# Patient Record
Sex: FEMALE | Race: WHITE | NOT HISPANIC OR LATINO | Employment: UNEMPLOYED | URBAN - METROPOLITAN AREA
[De-identification: names, ages, dates, MRNs, and addresses within clinical notes are randomized per-mention and may not be internally consistent; named-entity substitution may affect disease eponyms.]

---

## 2020-09-24 ENCOUNTER — OFFICE VISIT - RIVER FALLS (OUTPATIENT)
Dept: FAMILY MEDICINE | Facility: CLINIC | Age: 45
End: 2020-09-24
Payer: COMMERCIAL

## 2020-09-24 ASSESSMENT — MIFFLIN-ST. JEOR: SCORE: 1878.73

## 2021-04-04 ENCOUNTER — HEALTH MAINTENANCE LETTER (OUTPATIENT)
Age: 46
End: 2021-04-04

## 2021-04-12 ENCOUNTER — VIRTUAL VISIT (OUTPATIENT)
Dept: SURGERY | Facility: CLINIC | Age: 46
End: 2021-04-12
Payer: COMMERCIAL

## 2021-04-12 VITALS — BODY MASS INDEX: 44.42 KG/M2 | WEIGHT: 283 LBS | HEIGHT: 67 IN

## 2021-04-12 VITALS — HEIGHT: 67 IN | BODY MASS INDEX: 44.42 KG/M2 | WEIGHT: 283 LBS

## 2021-04-12 DIAGNOSIS — Z13.21 SCREENING FOR ENDOCRINE, NUTRITIONAL, METABOLIC AND IMMUNITY DISORDER: ICD-10-CM

## 2021-04-12 DIAGNOSIS — I10 ESSENTIAL HYPERTENSION: ICD-10-CM

## 2021-04-12 DIAGNOSIS — Z13.0 SCREENING FOR IRON DEFICIENCY ANEMIA: ICD-10-CM

## 2021-04-12 DIAGNOSIS — G47.33 OSA (OBSTRUCTIVE SLEEP APNEA): ICD-10-CM

## 2021-04-12 DIAGNOSIS — N39.3 STRESS INCONTINENCE: ICD-10-CM

## 2021-04-12 DIAGNOSIS — E66.01 MORBID OBESITY (H): Primary | ICD-10-CM

## 2021-04-12 DIAGNOSIS — R73.03 PREDIABETES: ICD-10-CM

## 2021-04-12 DIAGNOSIS — Z13.29 SCREENING FOR ENDOCRINE, NUTRITIONAL, METABOLIC AND IMMUNITY DISORDER: ICD-10-CM

## 2021-04-12 DIAGNOSIS — Z13.0 SCREENING FOR ENDOCRINE, NUTRITIONAL, METABOLIC AND IMMUNITY DISORDER: ICD-10-CM

## 2021-04-12 DIAGNOSIS — E66.01 MORBID OBESITY (H): ICD-10-CM

## 2021-04-12 DIAGNOSIS — G90.511 COMPLEX REGIONAL PAIN SYNDROME TYPE 1 OF RIGHT UPPER EXTREMITY: ICD-10-CM

## 2021-04-12 DIAGNOSIS — Z13.228 SCREENING FOR ENDOCRINE, NUTRITIONAL, METABOLIC AND IMMUNITY DISORDER: ICD-10-CM

## 2021-04-12 PROBLEM — F31.32 BIPOLAR AFFECTIVE DISORDER, CURRENTLY DEPRESSED, MODERATE (H): Status: ACTIVE | Noted: 2018-07-20

## 2021-04-12 PROCEDURE — 99205 OFFICE O/P NEW HI 60 MIN: CPT | Mod: 95 | Performed by: PHYSICIAN ASSISTANT

## 2021-04-12 PROCEDURE — 97802 MEDICAL NUTRITION INDIV IN: CPT | Mod: GT | Performed by: DIETITIAN, REGISTERED

## 2021-04-12 RX ORDER — GABAPENTIN 300 MG/1
900 CAPSULE ORAL 2 TIMES DAILY
COMMUNITY
Start: 2021-02-16 | End: 2022-09-08

## 2021-04-12 RX ORDER — DULOXETIN HYDROCHLORIDE 60 MG/1
CAPSULE, DELAYED RELEASE ORAL
COMMUNITY
Start: 2021-03-20 | End: 2021-12-07

## 2021-04-12 RX ORDER — RISPERIDONE 3 MG/1
3 TABLET ORAL 2 TIMES DAILY
COMMUNITY
Start: 2021-02-16 | End: 2022-09-08

## 2021-04-12 RX ORDER — DULOXETIN HYDROCHLORIDE 60 MG/1
20 CAPSULE, DELAYED RELEASE ORAL 2 TIMES DAILY
COMMUNITY
Start: 2021-02-16 | End: 2022-02-16

## 2021-04-12 RX ORDER — GABAPENTIN 300 MG/1
1200 CAPSULE ORAL EVERY EVENING
COMMUNITY
Start: 2021-02-16

## 2021-04-12 RX ORDER — ALBUTEROL SULFATE 90 UG/1
1 AEROSOL, METERED RESPIRATORY (INHALATION) PRN
COMMUNITY
Start: 2020-06-26 | End: 2022-09-08

## 2021-04-12 ASSESSMENT — MIFFLIN-ST. JEOR
SCORE: 1948.37
SCORE: 1948.37

## 2021-04-12 NOTE — PROGRESS NOTES
"Leanne is a 45 year old who is being evaluated via a billable video visit.      If the video visit is dropped, the invitation should be resent by: Text to cell phone: [phone]  Will anyone else be joining your video visit? No      Video-Visit Details    Type of service:  Video Visit    Video Start Time: 9:35 AM    Video End Time:10:19 AM        Originating Location (pt. Location): Home    Distant Location (provider location):  [facility] [place]     Platform used for Video Visit: [facility] Bariatric Surgery Consultation Note    2021    RE: Leanne Funes  MR#: [id number]  : 1975      Referring provider:       2021   Who referred you? My [place] provider       Chief Complaint/Reason for visit: evaluation for possible weight loss surgery    Dear No primary care provider on file.,    I had the pleasure of seeing your patient, Leanne Funes, to evaluate her obesity and consider her for possible weight loss surgery. As you know, Leanne Funes is 45 year old.  She has a height of 5' 6.5\"[pt reported[, a weight of 283 lbs 0 oz, and calculated Body mass index is 44.99 kg/m .     Assessment & Plan   Problem List Items Addressed This Visit     Morbid obesity (H) - Primary    Relevant Orders    CBC with platelets    Comprehensive metabolic panel    Hemoglobin A1c    Vitamin D Deficiency    NUTRITION REFERRAL    MENTAL HEALTH REFERRAL  - Adult; Assessments and Testing; Bariatric Eval; [facility]: [facility] [phone]; We will contact you to schedule the appointment or please call with any questions    Essential hypertension    Relevant Medications    losartan 50 MG TABS 50 mg, hydrochlorothiazide 12.5 MG CAPS 12.5 mg    Prediabetes    Stress incontinence      Other Visit Diagnoses     Screening for iron deficiency anemia        Relevant Orders    CBC with platelets    Screening for endocrine, nutritional, metabolic and immunity disorder        Relevant " Orders    Comprehensive metabolic panel    Hemoglobin A1c    Vitamin D Deficiency    RYAN (obstructive sleep apnea)               60 minutes spent on the date of the encounter doing chart review, history and exam, review test results, counseling, developing plan of care, documentation, and further activities as noted above.       HISTORY OF PRESENT ILLNESS:  Weight Loss History Reviewed with Patient 4/9/2021   How long have you been overweight? Since puberty   What is the most that you have ever weighed? 285   What is the most weight you have lost? 40   I have tried the following methods to lose weight Watching portions or calories, Exercise, Atkins type diet (low carb/high protein)   I have tried the following weight loss medications? (Check all that apply) None   Have you ever had weight loss surgery? No   Pt with 2 year old grandson who wants to improve health to be active with him.  Parents both has lapband surgery and have been successful with it.    CO-MORBIDITIES OF OBESITY INCLUDE:     4/9/2021   I have the following health issues associated with obesity: Pre-Diabetes, High Blood Pressure, Sleep Apnea, Stress Incontinence       PAST MEDICAL HISTORY:  Past Medical History:   Diagnosis Date     Bipolar affective disorder, currently depressed, moderate (H) 7/20/2018     Complex regional pain syndrome type 1 of right upper extremity      Essential hypertension 5/11/2017    Formatting of this note might be different from the original. Hypertension (HTN) Chronic     RYAN (obstructive sleep apnea)      Prediabetes 4/12/2021       PAST SURGICAL HISTORY: No history of bleeding, clotting, malignant hyperthermia, or other anesthesia problems with surgery. Denies PONV.  Past Surgical History:   Procedure Laterality Date     C VAGINAL HYSTERECTOMY  2/28/2001    Hysterectomy, Vaginal. right ovary no tremoved.     HC LAPAROSCOPY, SURGICAL; APPENDECTOMY  2/28/11      LAPAROSCOPY, SURGICAL; CHOLECYSTECTOMY  11/14/11        FAMILY HISTORY:   Family History   Problem Relation Age of Onset     Diabetes Mother      Obesity Mother S/p lapband     Diabetes Father      Obesity Father S/p lapband     Thyroid Disease Father      Anesthesia Reaction No family hx of      Blood Disease No family hx of        SOCIAL HISTORY:   Social History Questions Reviewed With Patient 4/9/2021   Which best describes your employment status (select all that apply) I am disabled [employer].  - has spinal stimulator   Which best describes your marital status:    Do you have children? Yes   Who do you have in your support network that can be available to help you for the first 2 weeks after surgery? ,daughter   Who can you count on for support throughout your weight loss surgery journey? , children, friends   Can you afford 3 meals a day?  Yes   Can you afford 50-60 dollars a month for vitamins? Yes       HABITS:     4/9/2021   How often do you drink alcohol? Monthly or less   If you do drink alcohol, how many drinks might you have in a day? (one drink = 5 oz. wine, 1 can/bottle of beer, 1 shot liquor) 1 or 2   If you previously used any of these products, what year did you quit? 2020   Have you or are you currently using street drugs or prescription strength medication for which you do not have a prescription for? No   Do you have a history of chemical dependency (alcohol or drug abuse)? No       PSYCHOLOGICAL HISTORY:   Psychological History Reviewed With Patient 4/9/2021   Have you ever attempted suicide? Never.   Have you had thoughts of suicide in the past year? No   Have you ever been hospitalized for mental illness or a suicide attempt? Never.   Do you have a history of chronic pain? Yes   Have you ever been diagnosed with fibromyalgia? No   Are you currently being treated for any of the following? (select all that apply) Depression, Bipolar- under good control.  Med management through PCP.    Are you currently seeing a therapist or  counselor?  No   Are you currently seeing a psychiatrist? No       ROS:     4/9/2021   Skin:  Skin fold rashes   HEENT: None of these   Musculoskeletal: Joint Pain- Shoulder- Aleve, Ibuprofen, Tylenol   Cardiovascular: Shortness of breath with activity   Pulmonary: Shortness of breath with activity, Snoring   Gastrointestinal: None of the above   Genitourinary: Stress incontinence    Hematological: None of the above   Neurological: None of the above   Female only: None of the above       EATING BEHAVIORS:     4/9/2021   Have you or anyone else thought that you had an eating disorder? No   Do you currently binge eat (eat a large amount of food in a short time)? No   Are you an emotional eater? Yes   Do you get up to eat after falling asleep? Yes       EXERCISE:     4/9/2021   How often do you exercise? 1 to 2 times per week   What is the duration of your exercise (in minutes)? 15 Minutes   What types of exercise do you do? walking, home gym   What keeps you from being more active?  Pain, I should be more active but I just have not gotten around to it       MEDICATIONS:  Current Outpatient Medications   Medication     albuterol (PROAIR HFA/PROVENTIL HFA/VENTOLIN HFA) 108 (90 Base) MCG/ACT inhaler     DULoxetine (CYMBALTA) 60 MG capsule     gabapentin (NEURONTIN) 300 MG capsule     Naproxen Sodium (ALEVE PO)     ondansetron (ZOFRAN ODT) 4 MG disintegrating tablet     risperiDONE (RISPERDAL) 4 MG tablet     silver sulfADIAZINE (SILVADENE) 1 % cream     sulfamethoxazole-trimethoprim (BACTRIM DS,SEPTRA DS) 800-160 MG per tablet     DULoxetine (CYMBALTA) 60 MG capsule     gabapentin (NEURONTIN) 300 MG capsule     losartan 50 MG TABS 50 mg, hydrochlorothiazide 12.5 MG CAPS 12.5 mg     No current facility-administered medications for this visit.         ALLERGIES:  Allergies   Allergen Reactions     Codeine Sulfate Nausea and Vomiting         LABS AND RECORDS REVIEWED:  Sodium   Date Value Ref Range Status   07/07/2012 141  "133 - 144 mmol/L Final     Potassium   Date Value Ref Range Status   07/07/2012 3.9 3.4 - 5.3 mmol/L Final     Chloride   Date Value Ref Range Status   07/07/2012 108 94 - 109 mmol/L Final     Carbon Dioxide   Date Value Ref Range Status   07/07/2012 24 20 - 32 mmol/L Final     Anion Gap   Date Value Ref Range Status   07/07/2012 9 6 - 17 mmol/L Final     Glucose   Date Value Ref Range Status   07/07/2012 109 (H) 60 - 99 mg/dL Final     Urea Nitrogen   Date Value Ref Range Status   07/07/2012 14 5 - 24 mg/dL Final     Creatinine   Date Value Ref Range Status   07/07/2012 0.81 0.52 - 1.04 mg/dL Final     GFR Estimate   Date Value Ref Range Status   07/07/2012 80 >60 mL/min/1.7m2 Final     Calcium   Date Value Ref Range Status   07/07/2012 8.8 8.5 - 10.4 mg/dL Final     Bilirubin Total   Date Value Ref Range Status   07/07/2012 0.3 0.2 - 1.3 mg/dL Final     Alkaline Phosphatase   Date Value Ref Range Status   07/07/2012 84 40 - 150 U/L Final     ALT   Date Value Ref Range Status   07/07/2012 27 0 - 50 U/L Final     AST   Date Value Ref Range Status   07/07/2012 26 0 - 45 U/L Final     WBC   Date Value Ref Range Status   07/07/2012 6.8 4.0 - 11.0 10e9/L Final     Hemoglobin   Date Value Ref Range Status   07/07/2012 12.4 11.7 - 15.7 g/dL Final     Hematocrit   Date Value Ref Range Status   07/07/2012 37.5 35.0 - 47.0 % Final     MCV   Date Value Ref Range Status   07/07/2012 90 78 - 100 fl Final     Platelet Count   Date Value Ref Range Status   07/07/2012 374 150 - 450 10e9/L Final         PHYSICAL EXAM:  Ht 5' 6.5\" (1.689 m)   Wt 283 lb (128.4 kg)   BMI 44.99 kg/m    GENERAL: Healthy, alert and no distress  EYES: Eyes grossly normal to inspection.  No discharge or erythema, or obvious scleral/conjunctival abnormalities.  RESP: No audible wheeze, cough, or visible cyanosis.  No visible retractions or increased work of breathing.    SKIN: Visible skin clear. No significant rash, abnormal pigmentation or " lesions.  NEURO: Cranial nerves grossly intact.  Mentation and speech appropriate for age.  PSYCH: Mentation appears normal, affect normal/bright, judgement and insight intact, normal speech and appearance well-groomed.    In summary, Leanne Funes has Class III obesity with a body mass index of Body mass index is 44.99 kg/m . kg/m2 and the comorbidities stated above. She completed an informational seminar and is a possible candidate for the laparoscopic gastric bypass.  She will have to complete the following pre-requisites:    Lose 5 lbs prior to surgery.  Goal Weight: 278 lbs    Have preoperative laboratory tests drawn.     Psychological Evaluation with MMPI and clearance for weight loss surgery.    Achieve clearance from dietitian to see surgeon.    Once the patient has completed the requirements in the above tasklist and there are no further recommendations, pt will see the surgeon for consultation for bariatric surgery. Pt to follow up in 1-2 months for a face to face visit with me. We will discuss the available weight loss surgeries including risks and benefits,  get an official weight, and do a physical exam. Patient verbalizes understanding of the process to surgery and the post operative schedule.  All questions were answered.          Sincerely,     Carly Nair PA-C

## 2021-04-12 NOTE — LETTER
Leanne Funes  April 12, 2021        Bariatric Task List      Required Weight loss:    Lose 5 lbs prior to surgery.  Goal Weight: 278 lbs  Tasks:  Have preoperative laboratory tests drawn.     Psychological Evaluation with MMPI and clearance for weight loss surgery.    Achieve clearance from dietitian to see surgeon.

## 2021-04-12 NOTE — PROGRESS NOTES
"Leanne is a 45 year old who is being evaluated via a billable video visit.      How would you like to obtain your AVS? MyChart  If the video visit is dropped, the invitation should be resent by: Text to cell phone: [phone]  Will anyone else be joining your video visit? No      Video Start Time: 10:29am    Video-Visit Details    Type of service:  Video Visit    Video End Time: 11:16am    Originating Location (pt. Location): Home    Distant Location (provider location):  [facility] [place]     Platform used for Video Visit: [employer]     New Bariatric Nutrition Consultation Note    Reason For Visit: Nutrition Assessment    Leanne Funes is a 45 year old presenting today for new bariatric nutrition consult.  Pt is interested in laparoscopic gastric bypass.  Patient is accompanied by self.    Support System Reviewed With Patient 4/9/2021   Who do you have in your support network that can be available to help you for the first 2 weeks after surgery? ,daughter   Who can you count on for support throughout your weight loss surgery journey? , children, friends       ANTHROPOMETRICS:   Estimated body mass index is 44.99 kg/m  as calculated from the following:    Height as of this encounter: 1.689 m (5' 6.5\").    Weight as of this encounter: 128.4 kg (283 lb).    Required weight loss goal pre-op: 5 lbs from initial consult weight (goal weight 278 lbs or less before surgery)       4/9/2021   I have tried the following methods to lose weight Watching portions or calories, Exercise, Atkins type diet (low carb/high protein)       Weight Loss Questions Reviewed With Patient 4/9/2021   How long have you been overweight? Since puberty       NUTRITION HISTORY:  Recall Diet Questions Reviewed With Patient 4/9/2021   Describe what you typically consume for breakfast (typical or most recent): Breakfast sandwich with egg,cheese,and meat   Describe what you typically consume for supper " (typical or most recent): Meat and cooked vegetables   Describe what you typically consume as snacks (typical or most recent): Raisens, cheese   How many ounces of water, or other low calorie drinks, do you drink daily (8 oz=1 glass)? 64 oz or more   How many ounces of caffeine (coffee, tea, pop) do you drink daily (8 oz=1 glass)? 48 oz   How many ounces of carbonated (pop, beer, sparkling water) drinks do you drinky daily (8 oz=1 glass)? 48 oz   How many ounces of juice, pop, sweet tea, sports drinks, protein drinks, other sweetened drinks, do you drink daily (8 oz=1 glass)? 48 oz   How many ounces of milk do you drink daily (8 oz=1 glass) 8 oz   Please indicate the type of milk: 2%   How often do you drink alcohol? Monthly or less   If you do drink alcohol, how many drinks might you have in a day? (one drink = 5 oz. wine, 1 can/bottle of beer, 1 shot liquor) 1 or 2       Eating Habits 4/9/2021   Do you have any dietary restrictions? No   Do you currently binge eat (eat a large amount of food in a short time)? No   Are you an emotional eater? Yes   Do you get up to eat after falling asleep? Yes   What foods do you crave? Soda and chocolate       ADDITIONAL INFORMATION:  Pt has been considering surgery for about 5 years, since her parents had lap band surgery. Pt believes eliminating caffeine/carbonation will be biggest challenge.     Dining Out History Reviewed With Patient 4/9/2021   How often do you dine out? Rarely.   Where do you dine out? (select all that apply) take out   What types of food do you order when you dine out? Fish, fries       Physical Activity Reviewed With Patient 4/9/2021   How often do you exercise? 1 to 2 times per week   What is the duration of your exercise (in minutes)? 15 Minutes   What types of exercise do you do? walking, home gym   What keeps you from being more active?  Pain, I should be more active but I just have not gotten around to it       NUTRITION DIAGNOSIS:  Obesity r/t long  history of self-monitoring deficit and excessive energy intake aeb BMI >30 kg/m2.    INTERVENTION:  Intervention Provided/Education Provided on post-op diet guidelines, vitamins/minerals essential post-operatively, GI anatomy of bariatric surgeries, ways to help prepare for post-op diet guidelines pre-operatively, portion/calorie-control, mindful eating.  Provided pt with list of goals RD contact information.      Questions Reviewed With Patient 4/9/2021   How ready are you to make changes regarding your weight? Number 1 = Not ready at all to make changes up to 10 = very ready. 10   How confident are you that you can change? 1 = Not confident that you will be successful making changes up to 10 = very confident. 10       Patient Understanding: good  Expected Compliance: good    GOALS:  Begin taking multivitamin, calcium and vitamin D per guidelines  Reduce caffeine by 50%  Reduce carbonated beverages by 50%    Follow-Up:   Recommend 2-3 follow up visits to assist with lifestyle changes or per insurance.      Time spent with patient: 47 minutes.      Shelley Romero RD, LD  Clinical Dietitian

## 2021-04-12 NOTE — PATIENT INSTRUCTIONS
Rosales Perdomo!    It was great chatting with you today! Here are some links to the information we discussed:      Vitamins/Minerals:   [email or web address]     Diet Guidelines:  [email or web address]         Here are the goals we discussed for this first month:  1. Begin taking a daily multivitamin, a calcium supplement and a vitamin D supplement - the doses/requirements for these are in the vitamin/mineral handout link above. The easiest schedule for you will be to take calcium 2-3x/ per day, and take everything else at bedtime.   2. Reduce caffeine by 50%  3. Reduce carbonated beverages by 50%     Your next visit can be scheduled via the call center at  and should be in one month. You will also need to see Carly within the next 1-2 months for an in-clinic appointment. Welcome to the program and let us know if any questions/concerns pop up!     Shelley Romero RD, LD  Clinical Dietitian

## 2021-05-16 NOTE — PROGRESS NOTES
Bariatric Pre-Surgery Visit    CC: Obesity    HPI: I had the pleasure of seeing Leanne in the office today to go over bariatric education.  I saw the patient last month to evaluate obesity and consider her for possible weight loss surgery.    Wt Readings from Last 4 Encounters:   05/17/21 282 lb 6.4 oz (128.1 kg)   04/12/21 283 lb (128.4 kg)   04/12/21 283 lb (128.4 kg)   06/25/12 231 lb (104.8 kg)      BARIATRIC METRICS:  Current Weight: 282 lb 6.4 oz (128.1 kg)  Body mass index is 44.9 kg/m .   Wt change since last visit (lbs): -0.6  Cumulative weight loss (lbs): 0.6    Labs Reviewed:  Sodium   Date Value Ref Range Status   07/07/2012 141 133 - 144 mmol/L Final     Potassium   Date Value Ref Range Status   07/07/2012 3.9 3.4 - 5.3 mmol/L Final     Chloride   Date Value Ref Range Status   07/07/2012 108 94 - 109 mmol/L Final     Carbon Dioxide   Date Value Ref Range Status   07/07/2012 24 20 - 32 mmol/L Final     Anion Gap   Date Value Ref Range Status   07/07/2012 9 6 - 17 mmol/L Final     Glucose   Date Value Ref Range Status   07/07/2012 109 (H) 60 - 99 mg/dL Final     Urea Nitrogen   Date Value Ref Range Status   07/07/2012 14 5 - 24 mg/dL Final     Creatinine   Date Value Ref Range Status   07/07/2012 0.81 0.52 - 1.04 mg/dL Final     GFR Estimate   Date Value Ref Range Status   07/07/2012 80 >60 mL/min/1.7m2 Final     Calcium   Date Value Ref Range Status   07/07/2012 8.8 8.5 - 10.4 mg/dL Final     Bilirubin Total   Date Value Ref Range Status   07/07/2012 0.3 0.2 - 1.3 mg/dL Final     Alkaline Phosphatase   Date Value Ref Range Status   07/07/2012 84 40 - 150 U/L Final     ALT   Date Value Ref Range Status   07/07/2012 27 0 - 50 U/L Final     AST   Date Value Ref Range Status   07/07/2012 26 0 - 45 U/L Final     WBC   Date Value Ref Range Status   07/07/2012 6.8 4.0 - 11.0 10e9/L Final     Hemoglobin   Date Value Ref Range Status   07/07/2012 12.4 11.7 - 15.7 g/dL Final     Hematocrit   Date Value Ref Range  "Status   07/07/2012 37.5 35.0 - 47.0 % Final     MCV   Date Value Ref Range Status   07/07/2012 90 78 - 100 fl Final     Platelet Count   Date Value Ref Range Status   07/07/2012 374 150 - 450 10e9/L Final       PE:  /78   Pulse 104   Ht 5' 6.5\" (1.689 m)   Wt 282 lb 6.4 oz (128.1 kg)   SpO2 94%   BMI 44.90 kg/m      GENERAL:  Good development and normal affect in no acute distress. Patient is alert and orientated x 3 and answers all questions appropriately.  HEENT: Head is normocephalic, nontraumatic. Pupils equal and round without conjunctival injection. Neck is supple without lymphadenopathy, thyroidmegaly, or mass. Trachea midline. Dentition WNL.   CARDIOVASCULAR:  Regular rate and rhythm without murmurs, rubs, or gallops.  RESPIRATORY: Lungs are clear to auscultation bilaterally with good breath sounds.  GASTROINTESTINAL: Abdomen is obese, nondistended, soft, nontender, without organomegaly or masses. No bruits.  LOWER EXTREMITIES: No LE edema bilaterally, no cyanosis, ulceration, or chronic venous stasis noted.  MUSCULOSKELETAL:  Moves all 4 extremities equal and strong. Has a normal gait.   NEUROLOGIC: Cranial nerves II-XII grossly intact.  SKIN: No intertriginous irritation or rash.     Assessment:  Morbid Obesity  Bariatric Education    Plan:  Reviewed tasklist    Lose 5 lbs prior to surgery.  Goal Weight: 278 lbs- pending    Have preoperative laboratory tests drawn. -pending    Psychological Evaluation with MMPI and clearance for weight loss surgery.- pending    Achieve clearance from dietitian to see surgeon.-pending    [facility] Call to Change Program- sent message to MA to confirm enrollment..     Today in the office we discussed gastric bypass surgery.  Preoperative, perioperative, and postoperative processes, management, and follow up were addressed.  Risks and benefits were outlined including the risk of death, PE, DVT, ulcer, N/V, stricture, hernia, wound infection, weight regain, and vitamin " deficiencies. I emphasized exercise and activity along with appropriate food choice as the main foundation for weight loss with surgery providing surgical reinforcement of this.  A goal sheet and support group handout were given to the patient. Patient contract was signed.     Once the patient has completed their task list and there are no further recommendations, they will see the surgeon for consultation for bariatric surgery.  All questions were answered. Patient verbalizes understanding of the process to surgery and expectations for the postoperative period including the need for lifelong lifestyle changes, vitamin supplementation, and laboratory monitoring.      FOLLOW-UP:  Return to clinic in 1 month to see the dietician.      25 minutes spent on the date of the encounter doing chart review, patient visit and documentation

## 2021-05-17 ENCOUNTER — OFFICE VISIT (OUTPATIENT)
Dept: SURGERY | Facility: CLINIC | Age: 46
End: 2021-05-17
Payer: COMMERCIAL

## 2021-05-17 ENCOUNTER — HOSPITAL ENCOUNTER (OUTPATIENT)
Dept: LAB | Facility: CLINIC | Age: 46
Discharge: HOME | End: 2021-05-17
Admitting: PHYSICIAN ASSISTANT
Payer: COMMERCIAL

## 2021-05-17 VITALS
BODY MASS INDEX: 44.32 KG/M2 | WEIGHT: 282.4 LBS | OXYGEN SATURATION: 94 % | HEART RATE: 104 BPM | HEIGHT: 67 IN | SYSTOLIC BLOOD PRESSURE: 122 MMHG | DIASTOLIC BLOOD PRESSURE: 78 MMHG

## 2021-05-17 DIAGNOSIS — Z71.89 ENCOUNTER FOR PRE-BARIATRIC SURGERY COUNSELING AND EDUCATION: ICD-10-CM

## 2021-05-17 DIAGNOSIS — G47.33 OBSTRUCTIVE SLEEP APNEA SYNDROME: ICD-10-CM

## 2021-05-17 DIAGNOSIS — E66.01 MORBID OBESITY (H): Primary | ICD-10-CM

## 2021-05-17 DIAGNOSIS — Z13.21 SCREENING FOR ENDOCRINE, NUTRITIONAL, METABOLIC AND IMMUNITY DISORDER: ICD-10-CM

## 2021-05-17 DIAGNOSIS — Z13.0 SCREENING FOR ENDOCRINE, NUTRITIONAL, METABOLIC AND IMMUNITY DISORDER: ICD-10-CM

## 2021-05-17 DIAGNOSIS — Z13.0 SCREENING FOR IRON DEFICIENCY ANEMIA: ICD-10-CM

## 2021-05-17 DIAGNOSIS — Z13.228 SCREENING FOR ENDOCRINE, NUTRITIONAL, METABOLIC AND IMMUNITY DISORDER: ICD-10-CM

## 2021-05-17 DIAGNOSIS — Z13.29 SCREENING FOR ENDOCRINE, NUTRITIONAL, METABOLIC AND IMMUNITY DISORDER: ICD-10-CM

## 2021-05-17 DIAGNOSIS — E66.01 MORBID OBESITY (H): ICD-10-CM

## 2021-05-17 LAB
ALBUMIN SERPL-MCNC: 3.6 G/DL (ref 3.4–5)
ALP SERPL-CCNC: 101 U/L (ref 40–150)
ALT SERPL W P-5'-P-CCNC: 46 U/L (ref 0–50)
ANION GAP SERPL CALCULATED.3IONS-SCNC: 5 MMOL/L (ref 3–14)
AST SERPL W P-5'-P-CCNC: 24 U/L (ref 0–45)
BILIRUB SERPL-MCNC: 0.2 MG/DL (ref 0.2–1.3)
BUN SERPL-MCNC: 14 MG/DL (ref 7–30)
CALCIUM SERPL-MCNC: 9.1 MG/DL (ref 8.5–10.1)
CHLORIDE SERPL-SCNC: 103 MMOL/L (ref 94–109)
CO2 SERPL-SCNC: 28 MMOL/L (ref 20–32)
CREAT SERPL-MCNC: 0.86 MG/DL (ref 0.52–1.04)
ERYTHROCYTE [DISTWIDTH] IN BLOOD BY AUTOMATED COUNT: 13.2 % (ref 10–15)
GFR SERPL CREATININE-BSD FRML MDRD: 81 ML/MIN/{1.73_M2}
GLUCOSE SERPL-MCNC: 103 MG/DL (ref 70–99)
HBA1C MFR BLD: 6 % (ref 0–5.6)
HCT VFR BLD AUTO: 39.7 % (ref 35–47)
HGB BLD-MCNC: 12.8 G/DL (ref 11.7–15.7)
MCH RBC QN AUTO: 29.1 PG (ref 26.5–33)
MCHC RBC AUTO-ENTMCNC: 32.2 G/DL (ref 31.5–36.5)
MCV RBC AUTO: 90 FL (ref 78–100)
PLATELET # BLD AUTO: 411 10E9/L (ref 150–450)
POTASSIUM SERPL-SCNC: 4.1 MMOL/L (ref 3.4–5.3)
PROT SERPL-MCNC: 7.4 G/DL (ref 6.8–8.8)
RBC # BLD AUTO: 4.4 10E12/L (ref 3.8–5.2)
SODIUM SERPL-SCNC: 136 MMOL/L (ref 133–144)
WBC # BLD AUTO: 11.7 10E9/L (ref 4–11)

## 2021-05-17 PROCEDURE — 80053 COMPREHEN METABOLIC PANEL: CPT | Performed by: PHYSICIAN ASSISTANT

## 2021-05-17 PROCEDURE — 85027 COMPLETE CBC AUTOMATED: CPT | Performed by: PHYSICIAN ASSISTANT

## 2021-05-17 PROCEDURE — 82306 VITAMIN D 25 HYDROXY: CPT | Performed by: PHYSICIAN ASSISTANT

## 2021-05-17 PROCEDURE — 83036 HEMOGLOBIN GLYCOSYLATED A1C: CPT | Mod: GZ | Performed by: PHYSICIAN ASSISTANT

## 2021-05-17 PROCEDURE — 99213 OFFICE O/P EST LOW 20 MIN: CPT | Performed by: PHYSICIAN ASSISTANT

## 2021-05-17 ASSESSMENT — MIFFLIN-ST. JEOR: SCORE: 1945.65

## 2021-05-18 LAB — DEPRECATED CALCIDIOL+CALCIFEROL SERPL-MC: 25 UG/L (ref 20–75)

## 2021-05-21 ENCOUNTER — VIRTUAL VISIT (OUTPATIENT)
Dept: SURGERY | Facility: CLINIC | Age: 46
End: 2021-05-21
Payer: COMMERCIAL

## 2021-05-21 VITALS — WEIGHT: 285.6 LBS | BODY MASS INDEX: 45.41 KG/M2

## 2021-05-21 DIAGNOSIS — E66.01 MORBID OBESITY (H): ICD-10-CM

## 2021-05-21 PROCEDURE — 97803 MED NUTRITION INDIV SUBSEQ: CPT | Mod: GT | Performed by: DIETITIAN, REGISTERED

## 2021-05-21 NOTE — PROGRESS NOTES
"Video-Visit Details    Type of service:  Video Visit    Video Start Time (time video started): 9:27    Video End Time (time video stopped): 9:42    Originating Location (pt. Location): Other sitting in parked car    Distant Location (provider location):  [facility] [place]     Mode of Communication:  Video Conference via [employer]    PRE SURGICAL WEIGHT LOSS NUTRITION APPOINTMENT    Leanne Funes  1975  female  [id number]  45 year old    ASSESSMENT    Desired Surgical Procedure: gastric bypass    REASON FOR VISIT:  Leanne Funes is a 45 year old year old female presents today for a pre-surgical weight loss follow-up appointment. Patient accompanied by self.    DIAGNOSIS:  Weight Status Obesity Grade III BMI >40    ANTHROPOMETRICS:  Height: 5'6.5\"  Initial Weight: 283 lbs     Current visit: 285.6 lbs   BMI: 45.4 kg/(m^2).    VITAMINS AND MINERALS:  1 Multivitamin with Minerals  Calcium with Vitamin D 3 times per day   5000 international unit(s) Vitamin D    NUTRITION HISTORY:  Breakfast: klein, 2 eggs, 2 pc low carb bread (8:00)  Lunch: leftovers (2 chicken legs and broccoli/cauliflower)  Supper: turkey burger + broccoli/cauliflower + cabbage  Snacks: radishes (bag); 2 cookies   Fluids Consumed: Water and decaf, unsweetened tea   Eating slower: Yes  Chewing foods thoroughly: Yes  Take 20-30 minutes to consume each meal: Yes  Fluids and meals separate by at least 30 minutes: improving started to think about   Carbonation: Diet Mt Dew   Caffeine: Mt Dew  Additional Information: Patient has eliminated potatoes, regular bread and rice from diet.  Patient has not received call from [facility] for phone consults.  Patient reduced Diet Mt Dew intake.      PHYSICAL ACTIVITY:  Type: walking   Frequency: 4 (days per week)  Duration: 30-40 (minutes)     DIAGNOSIS:  Previous Nutrition Diagnosis: Obesity related to long history of self- monitoring deficit and excessive energy intake " evidenced by BMI of 44.9 kg/m2  No change, modified below    Previous goals:   Begin taking multivitamin, calcium and vitamin D per guidelines-met  Reduce caffeine by 50%-met  Reduce carbonated beverages by 50%-met    Current Nutrition Diagnosis: Obesity related to long history of self-monitoring deficit and excessive energy intake as evidenced by BMI of 45.4 kg/m2.    INTERVENTION:  Nutrition Prescription: Recommended energy/nutrient modification.    GOALS:  Check calcium dose  Reduce diet Mt Dew by 50%  Practice avoiding liquids with your meals    Intervention:  - Discussed progress towards previous goals.  - Reinforced importance of making behavior changes in preparation for bariatric surgery.   - Assessed learning needs and learning preferences       NUTRITION MONITORING AND EVALUATION:  Expected patient engagement: good  Patient demonstrated good understanding.     Follow up: Continue to monitor patient closely regarding weight loss and diet.  # of visits needed: 2-3  Cleared by RD: No     TIME SPENT WITH PATIENT: 15 minutes    Elena José, RD, LD  [facility] Dietitian/[facility]   [phone] (office phone)

## 2021-06-03 NOTE — PROGRESS NOTES
"Video-Visit Details    Type of service:  Video Visit    Video Start Time: 11:30    Video End Time: 11:48    Originating Location (pt. Location): Other patient's daughter's house in [place]     Distant Location (provider location): Provider Remote Setting    Platform used for Video Visit: [employer]     PRE SURGICAL WEIGHT LOSS NUTRITION APPOINTMENT    Leanne Funes  1975  female  [id number]  45 year old    ASSESSMENT    Desired Surgical Procedure: gastric bypass    REASON FOR VISIT:  Leanne Funes is a 45 year old year old female presents today for a pre-surgical weight loss follow-up appointment. Patient accompanied by self.    DIAGNOSIS:  Weight Status Obesity Grade III BMI >40    ANTHROPOMETRICS:  Height: 66.5\"    Initial Weight: 283 lbs     Weight last visit: 285.6 lbs    Current weight: does not know current weight (282 lbs in clinic with provider-5/17/2021)  BMI: 44.8 kg/m2    VITAMINS AND MINERALS:  1 Multivitamin with Minerals  1000 mg Calcium, mg and zinc 3 times per day   5000 international unit(s) Vitamin D    NUTRITION HISTORY:  Breakfast: fruit and cereal or fruit and 2-3 eggs depending on hunger (8:00)  Lunch: leftovers (2 chicken legs and broccoli/cauliflower) or summer sausage sandwich + cauliflower and broccoli with ranch   Supper: turkey burger + broccoli/cauliflower + cabbage; meat and vegetables (reducing spice so can drink less fluids as realized drinks 32 oz fluids with meals)  Snacks: radishes (bag); tomatoes, broccoli, cauliflower   Fluids Consumed: Water and decaf, unsweetened tea  Eating slower: Yes-describes self as slow eater   Chewing foods thoroughly: Yes  Take 20-30 minutes to consume each meal: Yes  Fluids and meals separate by at least 30 minutes: No  Carbonation: none  Caffeine: none   Additional Information: Patient received phone call from [facility] and has appointments scheduled for phone line.  Patient working on necessary behaviors for surgery.      PHYSICAL ACTIVITY:  Type: " walking   Frequency: 3-4 (days per week)  Duration: 30 (minutes)     DIAGNOSIS:  Previous Nutrition Diagnosis: Obesity related to long history of self- monitoring deficit and excessive energy intake evidenced by BMI of 45.4 kg/m2  No change, modified below    Previous goals:   Check calcium dose - met  Reduce diet Mt Dew by 50% - met  Practice avoiding liquids with your meals -improving (able to reduce to 16 oz per paolo)    Current Nutrition Diagnosis: Obesity related to long history of self-monitoring deficit and excessive energy intake as evidenced by BMI of 44.8 kg/m2 (per PA-C appointment 5/17/2021)    INTERVENTION:  Nutrition Prescription: Recommended energy/nutrient modification.    GOALS:  Avoid liquids 30 minutes before, during and after meals   Reduce calcium to 2 per day   Continue exercising 3-4 days per week   Weigh self prior to next appointment     Intervention:  - Discussed progress towards previous goals.  - Reinforced importance of making behavior changes in preparation for bariatric surgery.   - Assessed learning needs and learning preferences  - Congratulated patient for eliminating soda      NUTRITION MONITORING AND EVALUATION:  Expected patient engagement: good  Patient demonstrated good understanding.     Follow up: Continue to monitor patient closely regarding weight loss and diet.  # of visits needed: 1-2  Cleared by RD: No     TIME SPENT WITH PATIENT: 18 minutes    Elena José, RD, LD  [facility] Dietitian/[facility] Loss [facility]   [phone] (office phone)

## 2021-06-04 ENCOUNTER — VIRTUAL VISIT (OUTPATIENT)
Dept: SURGERY | Facility: CLINIC | Age: 46
End: 2021-06-04
Payer: MEDICARE

## 2021-06-04 DIAGNOSIS — E66.01 MORBID OBESITY (H): ICD-10-CM

## 2021-06-04 PROCEDURE — 97803 MED NUTRITION INDIV SUBSEQ: CPT | Mod: 95 | Performed by: DIETITIAN, REGISTERED

## 2021-07-08 ENCOUNTER — VIRTUAL VISIT (OUTPATIENT)
Dept: SURGERY | Facility: CLINIC | Age: 46
End: 2021-07-08
Payer: MEDICARE

## 2021-07-08 VITALS — BODY MASS INDEX: 45.26 KG/M2 | WEIGHT: 284.7 LBS

## 2021-07-08 DIAGNOSIS — E66.01 MORBID OBESITY (H): ICD-10-CM

## 2021-07-08 PROCEDURE — 99441 PR PHYSICIAN TELEPHONE EVALUATION 5-10 MIN: CPT | Mod: 95 | Performed by: DIETITIAN, REGISTERED

## 2021-07-08 NOTE — PROGRESS NOTES
"Originating Location (pt. Location): Home    Distant Location (provider location):  [facility] [place]     Mode of Communication:  Telephone     PRE SURGICAL WEIGHT LOSS NUTRITION APPOINTMENT    Leanne Funes  1975  female  [id number]  45 year old    ASSESSMENT    Desired Surgical Procedure: gastric bypass    REASON FOR VISIT:  Leanne Funes is a 45 year old year old female presents today for a pre-surgical weight loss follow-up appointment. Patient accompanied by self.    DIAGNOSIS:  Weight Status Obesity Grade III BMI >40    ANTHROPOMETRICS:  Height: 66.5\"    Initial Weight: 283 lbs     Weight last visit: 282.6 lbs    Current weight: 284.7 lbs   BMI: 44.8 kg/m2    VITAMINS AND MINERALS:  1 Multivitamin with Minerals  1000 mg Calcium, mg and zinc 2 times per day   5000 international unit(s) Vitamin D     NUTRITION HISTORY:  Breakfast: shake (protein powder, berries, water) within 2 hours of waking   fruit and cereal or fruit and 2-3 eggs depending on hunger (8:00)  Lunch: leftovers (2 chicken legs and broccoli/cauliflower) or summer sausage/chicken salad sandwich (low carb bread) + fruit or cauliflower and broccoli with ranch or eggs   Supper: turkey burger + broccoli/cauliflower + cabbage; meat and vegetables (reducing spice so can drink less fluids as realized drinks 32 oz fluids with meals) meat + vegetable + sweet potato with butter   Snacks: radishes (bag); tomatoes, broccoli, cauliflower; trying to limit   Fluids Consumed: 32 oz Water and decaf, unsweetened tea-32 oz   Eating slower: Yes  Chewing foods thoroughly: Yes  Take 20-30 minutes to consume each meal: Yes  Fluids and meals separate by at least 30 minutes: Yes  Carbonation: none  Caffeine: none   Additional Information: Patient continues working on behavior changes.  Patient frustrated she is not losing weight.     PHYSICAL ACTIVITY:  Type: riding bike (indoor)   Frequency: 4 (days per week)  Duration: 5-30 " (minutes)     DIAGNOSIS:  Previous Nutrition Diagnosis: Obesity related to long history of self- monitoring deficit and excessive energy intake evidenced by BMI of 44.8 kg/m2  No change, modified below    Previous goals:   Avoid liquids 30 minutes before, during and after meals -met  Reduce calcium to 2 per day -met  Continue exercising 3-4 days per week -met  Weigh self prior to next appointment -met     Current Nutrition Diagnosis: Obesity related to long history of self-monitoring deficit and excessive energy intake as evidenced by BMI of 45.1 kg/m2.    INTERVENTION:  Nutrition Prescription: Recommended energy/nutrient modification.    GOALS:  Increase water >32 oz  Exercise 4 days per week for 30 minutes  Trial of modified weight loss diet  Continue practicing pre surgery diet guidelines     Intervention:  - Discussed progress towards previous goals.  - Reinforced importance of making behavior changes in preparation for bariatric surgery.   - Assessed learning needs and learning preferences  - Provided modified weight loss diet     NUTRITION MONITORING AND EVALUATION:  Expected patient engagement: good  Patient demonstrated good understanding.    Follow up: Continue to monitor patient closely regarding weight loss and diet.  # of visits needed: 1 or until meets weight loss goal   Cleared by RD: No     TIME SPENT WITH PATIENT: 15 minutes    Elena José, RD, LD  [facility] Outpatient Dietitian/Weight Loss Clinic   [phone] (office phone)

## 2021-07-24 ENCOUNTER — HEALTH MAINTENANCE LETTER (OUTPATIENT)
Age: 46
End: 2021-07-24

## 2021-08-05 ENCOUNTER — VIRTUAL VISIT (OUTPATIENT)
Dept: BEHAVIORAL HEALTH | Facility: CLINIC | Age: 46
End: 2021-08-05
Payer: COMMERCIAL

## 2021-08-05 ENCOUNTER — DOCUMENTATION ONLY (OUTPATIENT)
Dept: BEHAVIORAL HEALTH | Facility: CLINIC | Age: 46
End: 2021-08-05

## 2021-08-05 DIAGNOSIS — F39 UNSPECIFIED MOOD (AFFECTIVE) DISORDER (H): Primary | ICD-10-CM

## 2021-08-05 PROCEDURE — 90834 PSYTX W PT 45 MINUTES: CPT | Mod: GT | Performed by: PSYCHOLOGIST

## 2021-08-05 ASSESSMENT — ANXIETY QUESTIONNAIRES
IF YOU CHECKED OFF ANY PROBLEMS ON THIS QUESTIONNAIRE, HOW DIFFICULT HAVE THESE PROBLEMS MADE IT FOR YOU TO DO YOUR WORK, TAKE CARE OF THINGS AT HOME, OR GET ALONG WITH OTHER PEOPLE: SOMEWHAT DIFFICULT
3. WORRYING TOO MUCH ABOUT DIFFERENT THINGS: NOT AT ALL
GAD7 TOTAL SCORE: 2
2. NOT BEING ABLE TO STOP OR CONTROL WORRYING: NOT AT ALL
5. BEING SO RESTLESS THAT IT IS HARD TO SIT STILL: NOT AT ALL
6. BECOMING EASILY ANNOYED OR IRRITABLE: SEVERAL DAYS
7. FEELING AFRAID AS IF SOMETHING AWFUL MIGHT HAPPEN: SEVERAL DAYS
1. FEELING NERVOUS, ANXIOUS, OR ON EDGE: NOT AT ALL

## 2021-08-05 ASSESSMENT — COLUMBIA-SUICIDE SEVERITY RATING SCALE - C-SSRS
5. HAVE YOU STARTED TO WORK OUT OR WORKED OUT THE DETAILS OF HOW TO KILL YOURSELF? DO YOU INTEND TO CARRY OUT THIS PLAN?: NO
ATTEMPT LIFETIME: NO
3. HAVE YOU BEEN THINKING ABOUT HOW YOU MIGHT KILL YOURSELF?: NO
ATTEMPT PAST THREE MONTHS: NO
TOTAL  NUMBER OF INTERRUPTED ATTEMPTS PAST 3 MONTHS: NO
5. HAVE YOU STARTED TO WORK OUT OR WORKED OUT THE DETAILS OF HOW TO KILL YOURSELF? DO YOU INTEND TO CARRY OUT THIS PLAN?: NO
2. HAVE YOU ACTUALLY HAD ANY THOUGHTS OF KILLING YOURSELF?: NO
1. IN THE PAST MONTH, HAVE YOU WISHED YOU WERE DEAD OR WISHED YOU COULD GO TO SLEEP AND NOT WAKE UP?: NO
4. HAVE YOU HAD THESE THOUGHTS AND HAD SOME INTENTION OF ACTING ON THEM?: NO
6. HAVE YOU EVER DONE ANYTHING, STARTED TO DO ANYTHING, OR PREPARED TO DO ANYTHING TO END YOUR LIFE?: NO
1. IN THE PAST MONTH, HAVE YOU WISHED YOU WERE DEAD OR WISHED YOU COULD GO TO SLEEP AND NOT WAKE UP?: NO
TOTAL  NUMBER OF ABORTED OR SELF INTERRUPTED ATTEMPTS PAST 3 MONTHS: NO
6. HAVE YOU EVER DONE ANYTHING, STARTED TO DO ANYTHING, OR PREPARED TO DO ANYTHING TO END YOUR LIFE?: NO
TOTAL  NUMBER OF ABORTED OR SELF INTERRUPTED ATTEMPTS PAST LIFETIME: NO
2. HAVE YOU ACTUALLY HAD ANY THOUGHTS OF KILLING YOURSELF LIFETIME?: NO
4. HAVE YOU HAD THESE THOUGHTS AND HAD SOME INTENTION OF ACTING ON THEM?: NO
TOTAL  NUMBER OF INTERRUPTED ATTEMPTS LIFETIME: NO

## 2021-08-05 ASSESSMENT — PATIENT HEALTH QUESTIONNAIRE - PHQ9
5. POOR APPETITE OR OVEREATING: NOT AT ALL
SUM OF ALL RESPONSES TO PHQ QUESTIONS 1-9: 3

## 2021-08-05 NOTE — PROGRESS NOTES
[facility]  [facility]  [facility]       PATIENT'S NAME: Leanne Funes  PREFERRED NAME: Leanne  PRONOUNS: she/her  MRN: [id number]  : 1975  [address]  [address] [facility] [address]  Pt was in [facility] during visit  ACCT. NUMBER:  [id number]  DATE OF SERVICE: 21,21  START TIME: 1300  END TIME: 1350  PREFERRED PHONE: [phone]  May we leave a program related message: Yes  SERVICE MODALITY:  Started in video visit, had to change to Telephone  Visit due to poor video reception      Provider verified identity through the following two step process.  Patient provided:  Patient  and Patient address    Telemedicine Visit: The patient's condition can be safely assessed and treated via synchronous audio and visual telemedicine encounter.      Reason for Telemedicine Visit: Services only offered telehealth    Originating Site (Patient Location): Patient's other alone in car    Distant Site (Provider Location): Provider Remote Setting- Home Office    Consent:  The patient/guardian has verbally consented to: the potential risks and benefits of telemedicine (video visit) versus in person care; bill my insurance or make self-payment for services provided; and responsibility for payment of non-covered services.     Patient would like the video invitation sent by:  Send to e-mail at: [email or web address]    Mode of Communication:  Video Conference via ThisClicks    As the provider I attest to compliance with applicable laws and regulations related to telemedicine.    UNIVERSAL ADULT Mental Health DIAGNOSTIC ASSESSMENT    Identifying Information:  Patient is a 46 year old,   .  The pronoun use throughout this assessment reflects the patient's chosen pronoun.  Patient was referred for an assessment by primary care providerother  .  Patient attended the session alone.     Chief Complaint:   Patient was referred for an evaluation by the [facility]  [facility]. Patient is presenting for a psychological evaluation as a routine part of the process for pursuing weight loss surgery. She said she has \"fought with weight my whole life\".  She said she has been trying to lose weight for a couple of years and it is not working.  She said she thinks if she has the tool of the surgery it will help her take the weight off.   Patient reports they have attempted to lose weight in the past through, diet, exercise,  Keto, counting carbs.  She said her first attempt at weight loss was when she was 15 years old. She said she would diet and exercise and lose weight, then it would creep back on again.  She said the most weight she has ever lost dieting was right after high school.  She said she lost 70 pounds with diet and exercise.  She said she then \"yoyo'd with my weight\".  She said this lasted a \"couple years\". She said she weighs 271 currently.  She said her lowest weight has been 170.    The Patient reports they believe the surgery will benefit them by having better mobility.  Wants to get back into outdoor activities such as hunting and being able to do with activities with her grandson without being out of breath.         Social/Family History:  Patient reported they grew up in [facility] in [facility]--father was a civilian in the .  [place].  She said she moved to [place] after meeting her  in [facility].  She said he is from [place].   They were raised by biological parents.  Parents stayed ..She reports 2 brothers.   Patient reported that their childhood was \"pretty normal\".  She said she was raised by mother and father was gone a lot.  She said it was good\" .  Patient described their current relationships with family of origin as good relationship with parents.  She said she talks to her mother everyday and considers her one of her best friends.      The patient describes their cultural background as " ; .  Cultural influences and impact on patient's life structure, values, norms, and healthcare: Racial or Ethnic Self-Identification .  Contextual influences on patient's health include: Contextual factors affecting health;   She said as a  child, she was supposed to be strong all of the time.  She said she had to be careful of relationships because they were going to be broken in 2 years when she moved.   She said she was poor growing up.  She said they did not have a lot of money so everyone ate everything they were given.  She said she ate everything on her plate.  She said [place]ost[place]  Food was a huge social connection for everyone.  Any time she went to someone's house she was offered food.  She said it was a big deal for everyone.   These factors will be addressed in the Preliminary Treatment plan.  Patient identified their preferred language to be English. Patient reported they does not need the assistance of an  or other support involved in therapy.     Patient reported she was gestated to 40 weeks. She said she met all developmental milestones in the usual manner.  She said she had a speech impediment, does not remember what the impediment was, she said she went to speech therapy as a child and it resolved. .   Patient's highest education level was college graduate.  BA in Nursing.  Patient identified the following learning problems: none reported.  Modifications will not be used to assist communication in therapy.   Patient reports they are  able to understand written materials.    Patient reported the following relationship history [id number].  Patient's current relationship status is  since 1994.   Patient identified their sexual orientation as heterosexual.  Patient reported having two child(alexis).  She said she has 1 grandchild.  Patient identified mother, adult child, friends and spouse as part of their support system.   Patient identified the quality of these relationships as stable and meaningful.      Patient's current living/housing situation involves staying in own home/apartment.  They live with , daughter, daughter's fiance, grandchild and they report that housing is stable.     Patient is currently Chronic Regional Pain Syndrome--she is on disability for the past 3 years..  Patient reports their finances are obtained through disability.  Patient does not identify finances as a current stressor.  She said they manage their house on a cash basis--if there is no cash for it she does not do it      Patient reported that they have not been involved with the legal system.   Patient denies being on probation / parole / under the jurisdiction of the court.    Patient's Strengths and Limitations:  Patient identified the following strengths or resources that will help them succeed in treatment: motivation. Things that may interfere with the patient's success in treatment include: none identified.     Personal and Family Medical History:   Patient does not report a family history of mental health concerns.  Patient reports family history includes Diabetes in her father and mother; Obesity in her father and mother; Thyroid Disease in her father..     Patient does report Mental Health Diagnosis and/or Treatment.  Patient Patient reported the following previous diagnoses which include(s): a Bipolar Disorder.  Patient reported symptoms began adolescence.  She said she was diagnosed 4-5 years ago from an MD at the [facility].   Patient has received mental health services in the past: psychiatry, medication,.  Psychiatric Hospitalizations: None.  Patient denies a history of civil commitment.  Patient is not receiving other mental health services.  These include primary care provider at [facility] in [facility]  For follow-up on TBS.         Patient has had a physical exam to rule out medical causes for current symptoms.   Date of last physical exam was within the past year. Client was encouraged to follow up with PCP if symptoms were to develop. The patient has a non-[facility] Primary Care Provider. Their PCP is Jesi Crump..  Patient reports the following current medical concerns: chronic pain, high blood pressure, sleep apnea.  Patient denies any issues with pain..   There are not significant appetite / nutritional concerns / weight changes.   Patient does not report a history of head injury / trauma / cognitive impairment.    Changed portion sizes, does not drink while eating, trying to exercise more.  She said she only notices hunger as a trigger for wanting to eat.  Discussed spending time noticing if there is any other reason she eats  She then said she eats when bored.  Needs to come up with a list of things to do--at least 10 when she is bored that do not involve eating. List; reading, spending time with grandson, gardening, walking, spending time outside.      Patient reports current meds as:       Medication Adherence:  Patient reports taking prescribed medications as prescribed.    Patient Allergies:    Allergies   Allergen Reactions     Codeine Sulfate Nausea and Vomiting       Medical History:    Past Medical History:   Diagnosis Date     Bipolar affective disorder, currently depressed, moderate (H) 7/20/2018     Complex regional pain syndrome type 1 of right upper extremity      Essential hypertension 5/11/2017    Formatting of this note might be different from the original. Hypertension (HTN) Chronic     RYAN (obstructive sleep apnea)      Prediabetes 4/12/2021         Current Mental Status Exam:   Appearance:  Appropriate    Eye Contact:  Good   Psychomotor:  Normal       Gait / station:  no problem  Attitude / Demeanor: Cooperative   Speech      Rate / Production: Normal/ Responsive      Volume:  Normal  volume      Language:  no problems  Mood:   Normal  Affect:   Appropriate    Thought Content: Clear   Thought  Process: Coherent  Goal Directed  Logical       Associations: No loosening of associations  Insight:   Fair   Judgment:  Intact   Orientation:  All  Attention/concentration: Good    Rating Scales:    PHQ9:    PHQ-9 SCORE 7/19/2021 8/5/2021   PHQ-9 Total Score MyChart 1 (Minimal depression) -   PHQ-9 Total Score 1 3   ;    GAD7:    ADOLFO-7 SCORE 7/19/2021 8/5/2021   Total Score 3 (minimal anxiety) -   Total Score 3 2     CGI:     First:No data recorded;    Most recentNo data recorded    Substance Use:  Patient did not report a family history of substance use concerns; see medical history section for details.  Patient has not received chemical dependency treatment in the past.  Patient has not ever been to detox.      Patient is not currently receiving any chemical dependency treatment. Patient reported the following problems as a result of their substance use:  none reported.    Patient reports she uses alcohol infrequently; approximately bimonthly, 2 drinks per occasion.    Patient denies using tobacco.  Patient denies using cannabis.  Patient denies using caffeine.  Patient reports using/abusing the following substance(s). Patient reported no other substance use.     CAGE- AID:    CAGE-AID Total Score 7/19/2021   Total Score 0   Total Score MyChart 0 (A total score of 2 or greater is considered clinically significant)       Substance Use: No symptoms    Based on the negative CAGE score and clinical interview there  are not indications of drug or alcohol abuse.    Significant Losses / Trauma / Abuse / Neglect Issues:   Patient did not    serve in the .  There are indications or report of significant loss, trauma, abuse or neglect issues related to: are no indications and client denies any losses, trauma, abuse, or neglect concerns.  Concerns for possible neglect are not present.     Safety Assessment:   Current Safety Concerns:  Britt Suicide Severity Rating Scale (Lifetime/Recent)  Britt Suicide  Severity Rating (Lifetime/Recent) 8/5/2021   1. Wish to be Dead (Lifetime) No   1. Wish to be Dead (Recent) No   2. Non-Specific Active Suicidal Thoughts (Lifetime) No   2. Non-Specific Active Suicidal Thoughts (Recent) No   3. Active Suicidal Ideation with any Methods (Not Plan) Without Intent to Act (Lifetime) No   3. Active Suicidal Ideation with any Methods (Not Plan) Without Intent to Act (Recent) No   4. Active Suicidal Ideation with Some Intent to Act, Without Specific Plan (Lifetime) No   4. Active Suicidal Ideation with Some Intent to Act, Without Specific Plan (Recent) No   5. Active Suicidal Ideation with Specific Plan and Intent (Lifetime) No   5. Active Suicidal Ideation with Specific Plan and Intent (Recent) No   Most Severe Ideation Rating (Lifetime) NA   Most Severe Ideation Rating (Past Month) NA   Actual Attempt (Lifetime) No   Actual Attempt (Past 3 Months) No   Has subject engaged in non-suicidal self-injurious behavior? (Lifetime) No   Has subject engaged in non-suicidal self-injurious behavior? (Past 3 Months) No   Interrupted Attempts (Lifetime) No   Interrupted Attempts (Past 3 Months) No   Aborted or Self-Interrupted Attempt (Lifetime) No   Aborted or Self-Interrupted Attempt (Past 3 Months) No   Preparatory Acts or Behavior (Lifetime) No   Preparatory Acts or Behavior (Past 3 Months) No     Patient denies current homicidal ideation and behaviors.  Patient denies current self-injurious ideation and behaviors.    Patient denied risk behaviors associated with substance use.  Patient denies any high risk behaviors associated with mental health symptoms.  Patient reports the following current concerns for their personal safety: None.  Patient reports there are   firearms in the house.     yes, they are secured.      History of Safety Concerns:  Patient denied a history of homicidal ideation.     Patient denied a history of personal safety concerns.    Patient denied a history of assaultive  behaviors.    Patient denied a history of sexual assault behaviors.     Patient denied a history of risk behaviors associated with substance use.  Patient denies any history of high risk behaviors associated with mental health symptoms.  Patient reports the following protective factors: dedication to family or friends;safe and stable environment;regular sleep;effectively controls impulses;regular physical activity;sense of belonging;purpose;secure attachment;help seeking behaviors when distressed;abstinence from substances;adherence with prescribed medication;agreement to use safety plan;living with other people;daily obligations;structured day;effective problem solving skills;commitment to well being;sense of meaning;positive social skills;financial stability;strong sense of self worth or esteem;sense of personal control or determination;access to a variety of clinical interventions and pets      Risk Plan:  See Recommendations for Safety and Risk Management Plan    Review of Symptoms per patient report:  Depression: No symptoms  Believes her depression is under control--She said it has been 2-3 years since she has had depression symptoms.  Radha:   Pt reports her last manic episode was 2-3 years ago.  She said her previous manic episodes have included she would have a lot of energy, increased goal directed activity, impulsive shopping, decreased sleep, she said she does not know how long it lasts  Psychosis: No Symptoms  Anxiety: No Symptoms  She reports the last time she had anxiety was 3 years ago.  Panic:  No symptoms  Post Traumatic Stress Disorder:  No Symptoms   Eating Disorder: Pt reports she have never restricted her eating for fear of gaining weight.  She reports she has never been told she was underweight  Pt reports no binge eating behavior of purging. She said she has had some times that she will graze during the day when she is hungry but does not know for what.  She reports she has tried the  "following to lose weight; keto, WW, and other diets \"through the years\"  exercise regimens.  Highest weight 320.  She said she would lose weight on diets but would gain it back plus more.  She said the most weight she has lost was 40 pounds on a low carb diet about 1.5 years ago.  She said the weight slowly came back.  ADD / ADHD:  No symptoms  Conduct Disorder: No symptoms  Autism Spectrum Disorder: No symptoms  Obsessive Compulsive Disorder: No Symptoms    Patient reports the following compulsive behaviors and treatment history: none reported.      Diagnostic Criteria:   A. A distinct period of abnormally and persistently elevated, expansive, or irritable mood, lasting at least 1 week (or any duration if hospitalization is necessary).   B. During the period of mood disturbance, three (or more) of the following symptoms (four if the mood is only irritable) have persisted and have been present to a significant degree:   - decreased need for sleep (e.g., feels rested after only 3 hours of sleep)    - distractibility   - increase in goal-directed activity   - excessive involvement in pleasurable activities that have a high potential for painful consequences, such as spending money or sexual indiscretion.  C. The mood disturbance is sufficiently severe to cause marked impairment in social or occupational functioning or to necessitate hospitalization to prevent harm to self or others, or there are severe psychotic features  D. The symptoms are not attributable to the physiologicial effects of a substance or to another medical condition  E. The episode is sufficiently severe enough to cause marked impairment in social or occupational functioning or to necessitate hospitalization to prevent harm to self or others, or there are psychotic features  F. The symptoms are not due to the direct physiological effects of a substance (eg, a drug of abuse, a medication, or other treatment) or a general medical condition (eg, " hyperthyroidism).    Functional Status:  Patient reports the following functional impairments: work / vocational responsibilities.     WHODAS:   WHODAS 2.0 Total Score 8/5/2021   Total Score 18     Nonprogrammatic care:  Patient is requesting basic services to address current mental health concerns.    Clinical Summary:  1. Reason for assessment: Patient was referred for a psychological assessment as part of an evaluation for bariatric surgery.  2. Psychosocial, Cultural and Contextual Factors:   .Patient reports   3. Principal DSM5 Diagnoses  (Sustained by DSM5 Criteria Listed Above):   296.52 Bipolar I Disorder Current or Most Recent Episode Depressed, Moderate.  4. Other Diagnoses that is relevant to services:   None current.  5. Provisional Diagnosis: none current  6. Prognosis: Relieve Acute Symptoms.  7. Likely consequences of symptoms if not treated: continued symptom distress.  8. Client strengths include:  Caring, helpful, faithful    Recommendations:     1. Plan for Safety and Risk Management:   Recommended that patient call 911 or go to the local ED should there be a change in any of these risk factors..          Report to child / adult protection services was NA.     2. Patient identified no cultural or spiritual concerns for treatment services. Patient encouraged to ask for help should needs arise in the course of treatment.      3. Initial Treatment will focus on:    psychological assessment.     4. Resources/Service Plan:    services are not indicated.   Modifications to assist communication are not indicated.   Additional disability accommodations are not indicated.      5. Collaboration:   Collaboration / coordination of treatment will be initiated with the following  support professionals: primary care physician.      6.  Referrals:   The following referral(s) will be initiated: none current. Next Scheduled Appointment: 1 month.     A Release of Information has been obtained for the  following: Behavioral Health Clinician ([facility]) and primary care physician.    7. HERBERT:   HERBERT:  Discussed the general effects of drugs and alcohol on health and well-being.  8. Records:   These were not available for review at time of assessment.   Information in this assessment was obtained from the medical record and  provided by patient who is a good historian.    Patient will have open access to their mental health medical record.      Provider Name/ Credentials:  Dr. Irene Mccoy PSYD      August 5, 2021

## 2021-08-05 NOTE — PROGRESS NOTES
Progress Note - Initial Visit    Client Name:  Leanne Funes Date: 21         Service Type: Individual     Visit Start Time: 930  Visit End Time:     Visit #: 1    Attendees: Client attended alone    Service Modality:  Video Visit:      Provider verified identity through the following two step process.  Patient provided:  Patient  and Patient address    Telemedicine Visit: The patient's condition can be safely assessed and treated via synchronous audio and visual telemedicine encounter.      Reason for Telemedicine Visit: Services only offered telehealth    Originating Site (Patient Location): Patient's home    Distant Site (Provider Location): Provider Remote Setting- Home Office    Consent:  The patient/guardian has verbally consented to: the potential risks and benefits of telemedicine (video visit) versus in person care; bill my insurance or make self-payment for services provided; and responsibility for payment of non-covered services.     Patient would like the video invitation sent by:  Send to e-mail at: [email or web address]    Mode of Communication:  Video Conference via Amwell    As the provider I attest to compliance with applicable laws and regulations related to telemedicine.       DATA:   Interactive Complexity: No   Crisis: No     Presenting Concerns/  Current Stressors:   Patient is completing psychological evaluation as part of preparation for bariatric surgery.      ASSESSMENT:  Mental Status Assessment:  Appearance:   Appropriate   Eye Contact:   Good   Psychomotor Behavior: Normal   Attitude:   Cooperative   Orientation:   All  Speech   Rate / Production: Normal/ Responsive   Volume:  Normal   Mood:    Normal  Affect:    Appropriate   Thought Content:  Clear   Thought Form:  Coherent   Insight:    Fair       Safety Issues and Plan for Safety and Risk Management:     Glades Suicide Severity Rating Scale (Lifetime/Recent)  Glades Suicide Severity Rating  (Lifetime/Recent) 8/5/2021   1. Wish to be Dead (Lifetime) No   1. Wish to be Dead (Recent) No   2. Non-Specific Active Suicidal Thoughts (Lifetime) No   2. Non-Specific Active Suicidal Thoughts (Recent) No   3. Active Suicidal Ideation with any Methods (Not Plan) Without Intent to Act (Lifetime) No   3. Active Suicidal Ideation with any Methods (Not Plan) Without Intent to Act (Recent) No   4. Active Suicidal Ideation with Some Intent to Act, Without Specific Plan (Lifetime) No   4. Active Suicidal Ideation with Some Intent to Act, Without Specific Plan (Recent) No   5. Active Suicidal Ideation with Specific Plan and Intent (Lifetime) No   5. Active Suicidal Ideation with Specific Plan and Intent (Recent) No   Most Severe Ideation Rating (Lifetime) NA   Most Severe Ideation Rating (Past Month) NA   Actual Attempt (Lifetime) No   Actual Attempt (Past 3 Months) No   Has subject engaged in non-suicidal self-injurious behavior? (Lifetime) No   Has subject engaged in non-suicidal self-injurious behavior? (Past 3 Months) No   Interrupted Attempts (Lifetime) No   Interrupted Attempts (Past 3 Months) No   Aborted or Self-Interrupted Attempt (Lifetime) No   Aborted or Self-Interrupted Attempt (Past 3 Months) No   Preparatory Acts or Behavior (Lifetime) No   Preparatory Acts or Behavior (Past 3 Months) No     Patient denies current fears or concerns for personal safety.  Patient denies current or recent suicidal ideation or behaviors.  Patient denies current or recent homicidal ideation or behaviors.  Patient denies current or recent self injurious behavior or ideation.  Patient denies other safety concerns.  Recommended that patient call 911 or go to the local ED should there be a change in any of these risk factors.  Patient reports there are firearms in the house. The firearms are secured in a locked space.     Diagnostic Criteria:  Unspecified Mood Disorder  This category applies to presentations in which symptoms  characteristic of a depressive disorder that cause clinically significant distress or impairment in social, occupational, or other important areas of functioning predominate but do not meet the full criteria for any of the disorders in the depressive disorders diagnostic class , and do not meet criteria for adjustment disorder with depressed mood or adjustment disorder with mixed anxiety and depressed mood . The unspecified depressive disorder category is used in situations in which the clinician chooses not to specify the reason that the criteria are not met for a specific depressive disorder, and includes presentations for which there is insufficient information to make a more specific diagnosis (e.g., in emergency room settings).      DSM5 Diagnoses: (Sustained by DSM5 Criteria Listed Above)  Diagnoses: F39. Unspecified Mood Disorder  Hx of Depressive Disorder  Hx of Bipolar Disorder  Psychosocial & Contextual Factors: physical, disability  WHODAS 2.0 (12 item):   WHODAS 2.0 Total Score 8/5/2021   Total Score 18     Intervention:   Completed through review of safety issues and safety interventions  Collateral Reports Completed:  Not Applicable      PLAN: (Homework, other):  1. Provider will continue Diagnostic Assessment.  Patient was given the following to do until next session:  At least 10 activities to engage in when bored.    2. Provider recommended the following referrals: none pending completion of assessment.          Carlos Barksdale.GALLO, LP  August 5, 2021

## 2021-08-06 ASSESSMENT — ANXIETY QUESTIONNAIRES: GAD7 TOTAL SCORE: 2

## 2021-08-17 ENCOUNTER — VIRTUAL VISIT (OUTPATIENT)
Dept: BEHAVIORAL HEALTH | Facility: CLINIC | Age: 46
End: 2021-08-17
Payer: COMMERCIAL

## 2021-08-17 DIAGNOSIS — E66.01 MORBID OBESITY (H): ICD-10-CM

## 2021-08-18 NOTE — PROGRESS NOTES
Writer met with patient for appointment.  Pt had difficulty connecting to remote technology. Several attempts were made without success.  Discussed rescheduling due to the length of time it took to try to connect.  Pt agreed.  Appt rescheduled.  Dr. Irene Mccoy PSYD [facility]

## 2021-08-27 ENCOUNTER — VIRTUAL VISIT (OUTPATIENT)
Dept: SURGERY | Facility: CLINIC | Age: 46
End: 2021-08-27
Payer: MEDICARE

## 2021-08-27 VITALS — BODY MASS INDEX: 43.24 KG/M2 | WEIGHT: 272 LBS

## 2021-08-27 DIAGNOSIS — E66.01 MORBID OBESITY (H): ICD-10-CM

## 2021-08-27 PROCEDURE — 99442 PR PHYSICIAN TELEPHONE EVALUATION 11-20 MIN: CPT | Mod: TEL | Performed by: DIETITIAN, REGISTERED

## 2021-08-27 NOTE — PROGRESS NOTES
"Originating Location (pt. Location): Home    Distant Location (provider location):  [facility] [place] [facility]     Mode of Communication:  Telephone     PRE SURGICAL WEIGHT LOSS NUTRITION APPOINTMENT    Leanne Funes  1975  female  [id number]  46 year old    ASSESSMENT    Desired Surgical Procedure: gastric bypass    REASON FOR VISIT:  Leanne Funes is a 46 year old year old female presents today for a pre-surgical weight loss follow-up appointment. Patient accompanied by self.    DIAGNOSIS:  Weight Status Obesity Grade III BMI >40    ANTHROPOMETRICS:  Height: 66.5\"    Initial Weight: 283 lbs     Previous weight: 284.7 lbs   Current weight: 272 lbs   BMI: 43.2 kg/m2    VITAMINS AND MINERALS:  1 Multivitamin with Minerals  1000 mg Calcium, mg and zinc 2 times per day   5000 international unit(s) Vitamin D    NUTRITION HISTORY:  Breakfast: shake (protein powder, berries, water) within 2 hours of waking   fruit and cereal or fruit and 2-3 eggs depending on hunger (8:00)  Lunch: leftovers (2 chicken legs and broccoli/cauliflower) or summer sausage/chicken salad sandwich (low carb bread) + fruit or cauliflower and broccoli with ranch or eggs; egg salad on crackers or toast   Supper: turkey burger + broccoli/cauliflower + cabbage; meat and vegetables (reducing spice so can drink less fluids as realized drinks 32 oz fluids with meals) meat + vegetable + sweet potato with butter; 6-8 oz protein + vegetables  Snacks: eliminated   Fluids Consumed: Water  Eating slower: Yes  Chewing foods thoroughly: Yes  Take 20-30 minutes to consume each meal: Yes  Fluids and meals separate by at least 30 minutes: Yes  Carbonation: none  Caffeine: none  Additional Information: Patient focused on food choices and was able to meet weight loss goal.      PHYSICAL ACTIVITY:  Type: riding bike (indoor)   Frequency: 4 (days per week)  Duration: 30 (minutes)      DIAGNOSIS:  Previous Nutrition Diagnosis: " Obesity related to long history of self- monitoring deficit and excessive energy intake evidenced by BMI of 45.1 kg/m2  No change, modified below    Previous goals:   Increase water >32 oz-met   Exercise 4 days per week for 30 minutes-met  Trial of modified weight loss diet-not met (changed diet instead)  Continue practicing pre surgery diet guidelines-met    Current Nutrition Diagnosis: Obesity related to long history of self-monitoring deficit and excessive energy intake as evidenced by BMI of 43.2 kg/m2.    INTERVENTION:  Nutrition Prescription: Recommended energy/nutrient modification.    GOALS:  Continue following pre surgery diet guidelines     Intervention:  - Discussed progress towards previous goals.  - Reinforced importance of making behavior changes in preparation for bariatric surgery.   - Assessed learning needs and learning preferences  - Reviewed post op gastric bypass full liquid diet     NUTRITION MONITORING AND EVALUATION:  Expected patient engagement: good  Patient demonstrated good understanding.   Patient has met pre bariatric surgery diet requirements by meeting with RD for 5 sessions and making lifestyle changes     Follow up: Continue to monitor patient closely regarding weight loss and diet.  # of visits needed: 0  Cleared by RD: Yes     TIME SPENT WITH PATIENT: 15 minutes    Elena José, RD, LD  [facility]   [phone] (office phone)

## 2021-09-07 ENCOUNTER — VIRTUAL VISIT (OUTPATIENT)
Dept: BEHAVIORAL HEALTH | Facility: CLINIC | Age: 46
End: 2021-09-07
Payer: COMMERCIAL

## 2021-09-07 DIAGNOSIS — F31.32 BIPOLAR AFFECTIVE DISORDER, CURRENTLY DEPRESSED, MODERATE (H): Primary | ICD-10-CM

## 2021-09-07 PROCEDURE — 90791 PSYCH DIAGNOSTIC EVALUATION: CPT | Mod: GT | Performed by: PSYCHOLOGIST

## 2021-09-08 NOTE — PROGRESS NOTES
[facility]ing  Provider Name:  Dr. Irene Mccoy [facility]         PATIENT'S NAME:    Leanne Funes  PREFERRED NAME: Leanne  PRONOUNS: she/her  MRN:   [id number]  :   1975  [address]  [address] [facility] [address]  Pt was in [facility] during visit  ACCT. NUMBER:  [id number]  DATE OF SERVICE:  21,21  START TIME: 1300  END TIME: 1350  PREFERRED PHONE: [phone]  May we leave a program related message: Yes  SERVICE MODALITY:  Started in video visit, had to change to Telephone  Visit due to poor video reception      Provider verified identity through the following two step process.  Patient provided:  Patient  and Patient address     Telemedicine Visit: The patient's condition can be safely assessed and treated via synchronous audio and visual telemedicine encounter.       Reason for Telemedicine Visit: Services only offered telehealth     Originating Site (Patient Location): Patient's other alone in car     Distant Site (Provider Location): Provider Remote Setting- Home Office     Consent:  The patient/guardian has verbally consented to: the potential risks and benefits of telemedicine (video visit) versus in person care; bill my insurance or make self-payment for services provided; and responsibility for payment of non-covered services.      Patient would like the video invitation sent by:  Send to e-mail at: [email or web address]     Mode of Communication:  Video Conference via [employer]     As the provider I attest to compliance with applicable laws and regulations related to telemedicine.     UNIVERSAL ADULT Mental Health DIAGNOSTIC ASSESSMENT     Identifying Information:  Patient is a 46 year old,   .  The pronoun use throughout this assessment reflects the patient's chosen pronoun.  Patient was referred for an assessment by primary care providerother  .  Patient attended the session alone.      Chief Complaint:   Patient was referred for an evaluation by the  "[facility]. Patient is presenting for a psychological evaluation as a routine part of the process for pursuing weight loss surgery. She said she has \"fought with weight my whole life\".  She said she has been trying to lose weight for a couple of years and it is not working.  She said she thinks if she has the tool of the surgery it will help her take the weight off.   Patient reports they have attempted to lose weight in the past through, diet, exercise,  Keto, counting carbs.  She said her first attempt at weight loss was when she was 15 years old. She said she would diet and exercise and lose weight, then it would creep back on again.  She said the most weight she has ever lost dieting was right after high school.  She said she lost 70 pounds with diet and exercise.  She said she then \"yoyo'd with my weight\".  She said this lasted a \"couple years\". She said she weighs 271 currently.  She said her lowest weight has been 170.    The Patient reports they believe the surgery will benefit them by having better mobility.  Wants to get back into outdoor activities such as hunting and being able to do with activities with her grandson without being out of breath.          Social/Family History:  Patient reported they grew up in [facility] in [facility]--father was a civilian in the .  [place]  She said she moved to [place] after meeting her  in [place].  She said he is from [place].   They were raised by biological parents.  Parents stayed ..She reports 2 brothers.   Patient reported that their childhood was \"pretty normal\".  She said she was raised by mother and father was gone a lot.  She said it was good\" .  Patient described their current relationships with family of origin as good relationship with parents.  She said she talks to her mother everyday and considers her one of her best friends.       The patient describes their " cultural background as ; .  Cultural influences and impact on patient's life structure, values, norms, and healthcare: Racial or Ethnic Self-Identification .  Contextual influences on patient's health include: Contextual factors affecting health;   She said as a  child, she was supposed to be strong all of the time.  She said she had to be careful of relationships because they were going to be broken in 2 years when she moved.   She said she was poor growing up.  She said they did not have a lot of money so everyone ate everything they were given.  She said she ate everything on her plate.  She said [place]  Food was a huge social connection for everyone.  Any time she went to someone's house she was offered food.  She said it was a big deal for everyone.   These factors will be addressed in the Preliminary Treatment plan.  Patient identified their preferred language to be English. Patient reported they does not need the assistance of an  or other support involved in therapy.      Patient reported she was gestated to 40 weeks. She said she met all developmental milestones in the usual manner.  She said she had a speech impediment, does not remember what the impediment was, she said she went to speech therapy as a child and it resolved. .   Patient's highest education level was college graduate.  BA in Nursing.  Patient identified the following learning problems: none reported.  Modifications will not be used to assist communication in therapy.   Patient reports they are  able to understand written materials.     Patient reported the following relationship history [id number].  Patient's current relationship status is  since 1994.   Patient identified their sexual orientation as heterosexual.  Patient reported having two child(alexis).  She said she has 1 grandchild.  Patient identified mother, adult child, friends and spouse as part of  their support system.  Patient identified the quality of these relationships as stable and meaningful.       Patient's current living/housing situation involves staying in own home/apartment.  They live with , daughter, daughter's fiance, grandchild and they report that housing is stable.      Patient is currently Chronic Regional Pain Syndrome--she is on disability for the past 3 years..  Patient reports their finances are obtained through disability.  Patient does not identify finances as a current stressor.  She said they manage their house on a cash basis--if there is no cash for it she does not do it        Patient reported that they have not been involved with the legal system.   Patient denies being on probation / parole / under the jurisdiction of the court.     Patient's Strengths and Limitations:  Patient identified the following strengths or resources that will help them succeed in treatment: motivation. Things that may interfere with the patient's success in treatment include: none identified.      Personal and Family Medical History:   Patient does not report a family history of mental health concerns.  Patient reports family history includes Diabetes in her father and mother; Obesity in her father and mother; Thyroid Disease in her father..      Patient does report Mental Health Diagnosis and/or Treatment.  Patient Patient reported the following previous diagnoses which include(s): a Bipolar Disorder.  Patient reported symptoms began adolescence.  She said she was diagnosed 4-5 years ago from an MD at the [facility].   Patient has received mental health services in the past: psychiatry, medication,.  Psychiatric Hospitalizations: None.  Patient denies a history of civil commitment.  Patient is not receiving other mental health services.  These include primary care provider at [facility] in [facility].  For follow-up on TBS.          Patient has had a physical exam to rule out medical  causes for current symptoms.  Date of last physical exam was within the past year. Client was encouraged to follow up with PCP if symptoms were to develop. The patient has a non-[facility] Provider. Their PCP is Jesi Crump..  Patient reports the following current medical concerns: chronic pain, high blood pressure, sleep apnea.  Patient denies any issues with pain..   There are not significant appetite / nutritional concerns / weight changes.   Patient does not report a history of head injury / trauma / cognitive impairment.    Changed portion sizes, does not drink while eating, trying to exercise more.  She said she only notices hunger as a trigger for wanting to eat.  Discussed spending time noticing if there is any other reason she eats  She then said she eats when bored.  Needs to come up with a list of things to do--at least 10 when she is bored that do not involve eating. List; reading, spending time with grandson, gardening, walking, spending time outside.        Patient reports current meds as:         Medication Adherence:  Patient reports taking prescribed medications as prescribed.     Patient Allergies:         Allergies   Allergen Reactions     Codeine Sulfate Nausea and Vomiting         Medical History:    Past Medical History        Past Medical History:   Diagnosis Date     Bipolar affective disorder, currently depressed, moderate (H) 7/20/2018     Complex regional pain syndrome type 1 of right upper extremity       Essential hypertension 5/11/2017     Formatting of this note might be different from the original. Hypertension (HTN) Chronic     RYAN (obstructive sleep apnea)       Prediabetes 4/12/2021               Current Mental Status Exam:   Appearance:                Appropriate    Eye Contact:               Good   Psychomotor:              Normal       Gait / station:           no problem  Attitude / Demeanor:   Cooperative   Speech      Rate / Production:   Normal/ Responsive       Volume:                   Normal  volume      Language:               no problems  Mood:                          Normal  Affect:                          Appropriate    Thought Content:        Clear   Thought Process:        Coherent  Goal Directed  Logical       Associations:           No loosening of associations  Insight:                         Fair   Judgment:                   Intact   Orientation:                 All  Attention/concentration:          Good     Rating Scales:     PHQ9:    PHQ-9 SCORE 7/19/2021 8/5/2021   PHQ-9 Total Score MyChart 1 (Minimal depression) -   PHQ-9 Total Score 1 3   ;    GAD7:    ADOLFO-7 SCORE 7/19/2021 8/5/2021   Total Score 3 (minimal anxiety) -   Total Score 3 2      CGI:     First:No data recorded;               Most recentNo data recorded     Substance Use:  Patient did not report a family history of substance use concerns; see medical history section for details.  Patient has not received chemical dependency treatment in the past.  Patient has not ever been to detox.       Patient is not currently receiving any chemical dependency treatment. Patient reported the following problems as a result of their substance use:  none reported.     Patient reports she uses alcohol infrequently; approximately bimonthly, 2 drinks per occasion.    Patient denies using tobacco.  Patient denies using cannabis.  Patient denies using caffeine.  Patient reports using/abusing the following substance(s). Patient reported no other substance use.      CAGE- AID:    CAGE-AID Total Score 7/19/2021   Total Score 0   Total Score MyChart 0 (A total score of 2 or greater is considered clinically significant)         Substance Use: No symptoms     Based on the negative CAGE score and clinical interview there  are not indications of drug or alcohol abuse.     Significant Losses / Trauma / Abuse / Neglect Issues:   Patient did not    serve in the .  There are indications or report of significant  loss, trauma, abuse or neglect issues related to: are no indications and client denies any losses, trauma, abuse, or neglect concerns.  Concerns for possible neglect are not present.      Safety Assessment:   Current Safety Concerns:  [place] Suicide Severity Rating Scale (Lifetime/Recent)  [place] Suicide Severity Rating (Lifetime/Recent) 8/5/2021   1. Wish to be Dead (Lifetime) No   1. Wish to be Dead (Recent) No   2. Non-Specific Active Suicidal Thoughts (Lifetime) No   2. Non-Specific Active Suicidal Thoughts (Recent) No   3. Active Suicidal Ideation with any Methods (Not Plan) Without Intent to Act (Lifetime) No   3. Active Suicidal Ideation with any Methods (Not Plan) Without Intent to Act (Recent) No   4. Active Suicidal Ideation with Some Intent to Act, Without Specific Plan (Lifetime) No   4. Active Suicidal Ideation with Some Intent to Act, Without Specific Plan (Recent) No   5. Active Suicidal Ideation with Specific Plan and Intent (Lifetime) No   5. Active Suicidal Ideation with Specific Plan and Intent (Recent) No   Most Severe Ideation Rating (Lifetime) NA   Most Severe Ideation Rating (Past Month) NA   Actual Attempt (Lifetime) No   Actual Attempt (Past 3 Months) No   Has subject engaged in non-suicidal self-injurious behavior? (Lifetime) No   Has subject engaged in non-suicidal self-injurious behavior? (Past 3 Months) No   Interrupted Attempts (Lifetime) No   Interrupted Attempts (Past 3 Months) No   Aborted or Self-Interrupted Attempt (Lifetime) No   Aborted or Self-Interrupted Attempt (Past 3 Months) No   Preparatory Acts or Behavior (Lifetime) No   Preparatory Acts or Behavior (Past 3 Months) No      Patient denies current homicidal ideation and behaviors.  Patient denies current self-injurious ideation and behaviors.    Patient denied risk behaviors associated with substance use.  Patient denies any high risk behaviors associated with mental health symptoms.  Patient reports the following  current concerns for their personal safety: None.  Patient reports there are   firearms in the house.     yes, they are secured.        History of Safety Concerns:  Patient denied a history of homicidal ideation.     Patient denied a history of personal safety concerns.    Patient denied a history of assaultive behaviors.    Patient denied a history of sexual assault behaviors.     Patient denied a history of risk behaviors associated with substance use.  Patient denies any history of high risk behaviors associated with mental health symptoms.  Patient reports the following protective factors: dedication to family or friends;safe and stable environment;regular sleep;effectively controls impulses;regular physical activity;sense of belonging;purpose;secure attachment;help seeking behaviors when distressed;abstinence from substances;adherence with prescribed medication;agreement to use safety plan;living with other people;daily obligations;structured day;effective problem solving skills;commitment to well being;sense of meaning;positive social skills;financial stability;strong sense of self worth or esteem;sense of personal control or determination;access to a variety of clinical interventions and pets        Risk Plan:  See Recommendations for Safety and Risk Management Plan     Review of Symptoms per patient report:  Depression:     No symptoms  Believes her depression is under control--She said it has been 2-3 years since she has had depression symptoms.  Radha:              Pt reports her last manic episode was 2-3 years ago.  She said her previous manic episodes have included she would have a lot of energy, increased goal directed activity, impulsive shopping, decreased sleep, she said she does not know how long it lasts  Psychosis:       No Symptoms  Anxiety:           No Symptoms  She reports the last time she had anxiety was 3 years ago.  Panic:              No symptoms  Post Traumatic Stress Disorder:  No  "Symptoms   Eating Disorder:          Pt reports she have never restricted her eating for fear of gaining weight.  She reports she has never been told she was underweight  Pt reports no binge eating behavior of purging. She said she has had some times that she will graze during the day when she is hungry but does not know for what.  She reports she has tried the following to lose weight; keto, WW, and other diets \"through the years\"  exercise regimens.  Highest weight 320.  She said she would lose weight on diets but would gain it back plus more.  She said the most weight she has lost was 40 pounds on a low carb diet about 1.5 years ago.  She said the weight slowly came back.  ADD / ADHD:              No symptoms  Conduct Disorder:       No symptoms  Autism Spectrum Disorder:     No symptoms  Obsessive Compulsive Disorder:       No Symptoms     Patient reports the following compulsive behaviors and treatment history: none reported.       Diagnostic Criteria:   A. A distinct period of abnormally and persistently elevated, expansive, or irritable mood, lasting at least 1 week (or any duration if hospitalization is necessary).   B. During the period of mood disturbance, three (or more) of the following symptoms (four if the mood is only irritable) have persisted and have been present to a significant degree:   - decreased need for sleep (e.g., feels rested after only 3 hours of sleep)    - distractibility   - increase in goal-directed activity   - excessive involvement in pleasurable activities that have a high potential for painful consequences, such as spending money or sexual indiscretion.  C. The mood disturbance is sufficiently severe to cause marked impairment in social or occupational functioning or to necessitate hospitalization to prevent harm to self or others, or there are severe psychotic features  D. The symptoms are not attributable to the physiologicial effects of a substance or to another medical " condition  E. The episode is sufficiently severe enough to cause marked impairment in social or occupational functioning or to necessitate hospitalization to prevent harm to self or others, or there are psychotic features  F. The symptoms are not due to the direct physiological effects of a substance (eg, a drug of abuse, a medication, or other treatment) or a general medical condition (eg, hyperthyroidism).     Functional Status:  Patient reports the following functional impairments: work / vocational responsibilities.     WHODAS:   WHODAS 2.0 Total Score 8/5/2021   Total Score 18      Nonprogrammatic care:  Patient is requesting basic services to address current mental health concerns.     Clinical Summary:  1. Reason for assessment: Patient was referred for a psychological assessment as part of an evaluation for bariatric surgery.  2. Psychosocial, Cultural and Contextual Factors:   .Patient reports   3. Principal DSM5 Diagnoses  (Sustained by DSM5 Criteria Listed Above):   296.52 Bipolar I Disorder Current or Most Recent Episode Depressed, Moderate.  4. Other Diagnoses that is relevant to services:   None current.  5. Provisional Diagnosis: none current  6. Prognosis: Relieve Acute Symptoms.  7. Likely consequences of symptoms if not treated: continued symptom distress.  8. Client strengths include:  Caring, helpful, faithful     Recommendations:      1. Plan for Safety and Risk Management:              Recommended that patient call 911 or go to the local ED should there be a change in any of these risk factors..                                                                                                  Report to child / adult protection services was NA.      2. Patient identified no cultural or spiritual concerns for treatment services. Patient encouraged to ask for help should needs arise in the course of treatment.       3. Initial Treatment will focus on:               psychological assessment.                 4. Resources/Service Plan:               services are not indicated.              Modifications to assist communication are not indicated.              Additional disability accommodations are not indicated.                 5. Collaboration:              Collaboration / coordination of treatment will be initiated with the following             support professionals: primary care physician.      6.  Referrals:              The following referral(s) will be initiated: none current. Next Scheduled Appointment: 1 month.                 A Release of Information has been obtained for the following: Behavioral Health Clinician ([facility]) and primary care physician.     7. HERBERT:              HERBERT:  Discussed the general effects of drugs and alcohol on health and well-being.  8. Records:              These were not available for review at time of assessment.              Information in this assessment was obtained from the medical record and  provided by patient who is a good historian.    Patient will have open access to their mental health medical record.        Provider Name/ Credentials:  Dr. Irene Mccoy PSYD, LP                           August 5, 2021

## 2021-09-18 ENCOUNTER — HEALTH MAINTENANCE LETTER (OUTPATIENT)
Age: 46
End: 2021-09-18

## 2021-09-24 ENCOUNTER — VIRTUAL VISIT (OUTPATIENT)
Dept: BEHAVIORAL HEALTH | Facility: CLINIC | Age: 46
End: 2021-09-24
Payer: COMMERCIAL

## 2021-09-24 DIAGNOSIS — F31.32 MODERATE DEPRESSED BIPOLAR I DISORDER (H): Primary | ICD-10-CM

## 2021-09-24 PROCEDURE — 96131 PSYCL TST EVAL PHYS/QHP EA: CPT | Mod: TEL | Performed by: PSYCHOLOGIST

## 2021-09-24 PROCEDURE — 96130 PSYCL TST EVAL PHYS/QHP 1ST: CPT | Mod: TEL | Performed by: PSYCHOLOGIST

## 2021-09-24 NOTE — PROGRESS NOTES
Progress Note    Client Name: Leanne Funes  Date: 09/24/21         Service Type: Individual      Session Start Time: 0930  Session End Time: 100      Session Length: 30 minutes     Session #: 4     Attendees: Client attended alone       DATA      Progress Since Last Session (Related to Symptoms / Goals / Homework):   Symptoms: Stable  Patient reports she did have a conversation with her  about her surgery and worry she has about the surgery.  She said he said he did not realize that is was stressful for her. She said he wants her to be happy and healthy and will support her in any way she needs.    Homework: Achieved  She said she has been taking more walks, spending more time with her grandchild, she said she has been reading more for relaxation  Discussed skills to use if physical uncomfortable; snuggling in her Cambodian from her best friend, meditation, stretching,       Current / Ongoing Stressors and Concerns:   She reports no ongoing stressors or concerns.      Treatment Objective(s) Addressed in This Session:     Present results of assessment       Intervention:   One month follow-up post psychological assessment.  Reviewed client's  Progress with homework over the past month.       Discussed changes that may occur in relationships following weight loss surgery  and how to manage these changes.   She reports continued conversations with her  so he can support her emotionally and physically.    Reviewed results of the MMPI-2 evaluation.  Discussed her willingness to talk about her physical distress and that she appears willing to ask for help.  Discussed she may benefit from follow up appointments  Client's questions were  answered.       CBT: Problem solved acceptance and managing physical syptoms.        ASSESSMENT: Current Emotional / Mental Status (status of significant symptoms):   Risk status (Self / Other harm or suicidal ideation)   Client denies current fears or concerns for personal  safety.   Client denies current or recent suicidal ideation or behaviors.   Client denies current or recent homicidal ideation or behaviors.   Client denies current or recent self injurious behavior or ideation.   Client denies other safety concerns.   Recommended that patient call 911 or go to the local ED should there be a change in any of these risk factors.     Appearance:   Unable to assess due to telephone assessment   Eye Contact:   Unable to assess due to telephone assessment   Psychomotor Behavior: Unable to assess due to telephone assessment   Attitude:   Cooperative    Orientation:   All   Speech    Rate / Production: Normal/ Responsive Normal     Volume:  Normal    Mood:    Normal   Affect:    Unable to assess due to telephone assessment   Thought Content:  Clear    Thought Form:  Coherent  Logical    Insight:    Fair      Medication Review:   No changes to current psychiatric medication(s)     Medication Compliance:   Yes     Changes in Health Issues:   None reported     Chemical Use Review:   Substance Use: Chemical use reviewed, no active concerns identified Patient reports continued understanding of changes to the absorption and metabolization of alcohol after bariatric surgery.     Tobacco Use: No current tobacco use.       Collateral Reports Completed:   Routed note to Care Team Member(s)    Summary and Final Recommendation:    To summarize the 3 primary conditions being evaluated in the psychological assessment:     1. Client is prepared to comply with ongoing aftercare and lifestyle  changes after surgery--condition satisfied     Client has made some meaningful initial changes to their eating and activity habits.  Client reported an understanding of the need for lifestyle changes to eating and activity habits.  Client reported they are willingness focus on making lifestyle changes to eating and activity habits post surgery.   Discussed need for support and she is willing to go to an after care  group.        2. Client is emotionally stable to proceed with surgery--condition satisfied     Patient reports her mood has been more stable in the past few years.  She is not reporting any symptoms of depression or esther.       3. Client is cognitively capable of understanding the risks of the  procedure--condition satisfied     Client has been able to demonstrate that she understands  the risks of surgery (compared to the risks of not having surgery).  She was able to identify risks and benefits of surgery.       With respect to remaining risk factors, Writer suggested patient engage in a support group to connect with others going through a similar experience for validation and encouragment    PLAN: (Client Tasks / Therapist Tasks / Other)    Client has completed psychological assessment required for bariatric surgery  Complete report will be forwarded to the weight loss surgery clinic for review.      Recommend standard post-surgical follow up, with sessions 1 and 3 months postsurgery, to assess client's functioning and adjustment post-surgical lifestyle.      Client was encouraged to attend a weight loss surgery support group, starting before surgery and continuing afterwards, for additional accountability and support.    Client was provided with writer's contact information and is aware that she may contact writer sooner as needed.      Irene Mccoy, Psy.D, LP  9/24/2021

## 2021-10-07 ENCOUNTER — OFFICE VISIT (OUTPATIENT)
Dept: SURGERY | Facility: CLINIC | Age: 46
End: 2021-10-07
Payer: COMMERCIAL

## 2021-10-07 VITALS
DIASTOLIC BLOOD PRESSURE: 64 MMHG | SYSTOLIC BLOOD PRESSURE: 122 MMHG | OXYGEN SATURATION: 95 % | HEIGHT: 67 IN | WEIGHT: 270.6 LBS | BODY MASS INDEX: 42.47 KG/M2 | HEART RATE: 113 BPM

## 2021-10-07 DIAGNOSIS — E66.01 MORBID OBESITY (H): Primary | ICD-10-CM

## 2021-10-07 PROCEDURE — 99215 OFFICE O/P EST HI 40 MIN: CPT | Performed by: SURGERY

## 2021-10-07 ASSESSMENT — MIFFLIN-ST. JEOR: SCORE: 1887.12

## 2021-10-07 NOTE — PROGRESS NOTES
"Dear Dr. Aguirre Ref-Primary, Physician,      Referring provider:       4/9/2021   Who referred you? My [place] provider     I was asked to see the patient regarding obesity by the referring provider above.    I had the pleasure of meeting with your patient Leanne Funes in our weight loss surgery office.  This patient is a 46 year old female who has been undergoing our thorough preoperative screening process in anticipation of potential bariatric surgery.    At initial evaluation we recorded Leanne Funes's Height: 168.9 cm (5' 6.5\"), weight was 283 pounds   and BMI was 44.99  .  The patient has been unsuccessful with other methods of permanent weight loss and suffers from multiple weight related medical conditions.  Due to lack of success in achieving weight loss through other methods, she is interested in undergoing bariatric surgery.    PREVIOUS WEIGHT LOSS ATTEMPTS:     4/9/2021   I have tried the following methods to lose weight Watching portions or calories, Exercise, Atkins type diet (low carb/high protein)       CO-MORBIDITIES OF OBESITY INCLUDE:     4/9/2021   I have the following health issues associated with obesity: Pre-Diabetes, High Blood Pressure, Sleep Apnea, Stress Incontinence       VITALS:  /64 (BP Location: Left arm, Patient Position: Sitting, Cuff Size: Adult Regular)   Pulse 113   Ht 1.689 m (5' 6.5\")   Wt 122.7 kg (270 lb 9.6 oz)   SpO2 95%   BMI 43.02 kg/m      PE:  GENERAL: Alert and oriented x3. NAD  HEENT exam: Sclerae not icteric. Hearing good. Head normocephalic and atraumatic.   CARDIOVASCULAR: No JVD  RESPIRATORY: Breathing unlabored  GASTROINTESTINAL: Obese  LOWER EXTREMITIES: no deformities  MUSCULOSKELETAL: Normal gait, Moves all 4 extremities equal and strong  NEUROLOGIC: no gross defect  SKIN: warm and dry to touch     In summary, she has undergone an appropriate medical evaluation, dietitian evaluation, as well as psychologic screening. The patient appears to be an " appropriate candidate for bariatric surgery.    In the office today, I discussed the Robotic assisted laparoscopic Ileana-en-Y gastric bypass surgery.  Risks, benefits and anticipated outcomes were outlined including the risk of death, staple line leak (1-2%), PE, DVT, ulcer, worsening GERD, N/V, stricture, hernia, wound infection, weight regain, and vitamin deficiencies. This patient has a good chance of sustaining permanent weight loss due to this procedure.  This should also allow improvement if not resolution of his/her weight related medical conditions.    At present we are going to present your patient's file for prior authorization to insurance.  Pending prior authorization, I anticipate a surgical date in the near future.  We will keep you updated on any progress.  If you have questions regarding care please feel free to contact me.  Total time spent in the clinic was 45 minutes with greater than 50% in face-to-face consultation.        Sincerely,    Taiwo Gomez MD    Please route or send letter to:  Primary Care Provider (PCP) and Referring Provider

## 2021-10-19 ENCOUNTER — PREP FOR PROCEDURE (OUTPATIENT)
Dept: SURGERY | Facility: CLINIC | Age: 46
End: 2021-10-19

## 2021-10-19 DIAGNOSIS — E66.01 MORBIDLY OBESE (H): Primary | ICD-10-CM

## 2021-11-08 NOTE — PROGRESS NOTES
Virtual bariatric pre op class completed.  Class power point and patient checklist information gone over with patient.    All questions were answered.  Quiz was completed.    Patient was advised to call if further questions.  Lucie Lewis MS, RD, RN

## 2021-11-12 ENCOUNTER — VIRTUAL VISIT (OUTPATIENT)
Dept: SURGERY | Facility: CLINIC | Age: 46
End: 2021-11-12
Payer: MEDICARE

## 2021-11-12 DIAGNOSIS — E66.01 MORBIDLY OBESE (H): Primary | ICD-10-CM

## 2021-11-12 PROCEDURE — 99207 PR NO CHARGE NURSE ONLY: CPT

## 2021-11-12 NOTE — PROGRESS NOTES
Called patient after class re: COVID test needed preop.  Lives in [place], [place] too difficult to navigate getting it done on time and back to us.   to call and schedule for [facility].  Lucie Lewis, MS, RD, RN

## 2021-11-13 DIAGNOSIS — Z11.59 ENCOUNTER FOR SCREENING FOR OTHER VIRAL DISEASES: ICD-10-CM

## 2021-11-16 ENCOUNTER — TELEPHONE (OUTPATIENT)
Dept: SURGERY | Facility: CLINIC | Age: 46
End: 2021-11-16
Payer: COMMERCIAL

## 2021-11-16 NOTE — TELEPHONE ENCOUNTER
I spoke with patient regarding pre procedure covid test. Patient will have test @ [facility] and results will be faxed to AM surgery. Patient will also hand deliver results on surgery day.

## 2021-12-07 ENCOUNTER — ANESTHESIA EVENT (OUTPATIENT)
Dept: SURGERY | Facility: CLINIC | Age: 46
DRG: 621 | End: 2021-12-07
Payer: COMMERCIAL

## 2021-12-07 RX ORDER — MULTIPLE VITAMINS W/ MINERALS TAB 9MG-400MCG
1 TAB ORAL DAILY
COMMUNITY

## 2021-12-07 RX ORDER — VITAMIN B COMPLEX
1 TABLET ORAL DAILY
COMMUNITY

## 2021-12-07 RX ORDER — KETOCONAZOLE 20 MG/G
CREAM TOPICAL DAILY PRN
COMMUNITY

## 2021-12-07 RX ORDER — MULTIVIT WITH MINERALS/LUTEIN
1000 TABLET ORAL DAILY
COMMUNITY

## 2021-12-07 RX ORDER — LOSARTAN POTASSIUM 50 MG/1
50 TABLET ORAL DAILY
COMMUNITY
End: 2022-03-10

## 2021-12-07 RX ORDER — HYDROCHLOROTHIAZIDE 12.5 MG/1
12.5 TABLET ORAL DAILY
Status: ON HOLD | COMMUNITY
End: 2021-12-09

## 2021-12-07 ASSESSMENT — LIFESTYLE VARIABLES: TOBACCO_USE: 0

## 2021-12-07 ASSESSMENT — COPD QUESTIONNAIRES: COPD: 0

## 2021-12-07 NOTE — PROGRESS NOTES
PTA medications updated by Medication Scribe prior to surgery via phone call with patient (last doses completed by Nurse)     Medication history sources: Patient, Surescripts and H&P  In the past week, patient estimated taking medication this percent of the time: Greater than 90%  Adherence assessment: N/A Not Observed    Significant changes made to the medication list:  None      Additional medication history information:   None    Medication reconciliation completed by provider prior to medication history? No    Time spent in this activity: 35 MINUTES    The information provided in this note is only as accurate as the sources available at the time of update(s)      Prior to Admission medications    Medication Sig Last Dose Taking? Auth Provider   albuterol (PROAIR HFA/PROVENTIL HFA/VENTOLIN HFA) 108 (90 Base) MCG/ACT inhaler Inhale 1 puff into the lungs as needed  at PRN Yes Reported, Patient   DULoxetine (CYMBALTA) 60 MG capsule Take 60 mg by mouth 2 times daily   at PM Yes Reported, Patient   gabapentin (NEURONTIN) 300 MG capsule Take 900 mg by mouth 2 times daily IN THE MORNING AND AFTERNOON  (300MG X 3 = 900MG)  at AM Yes Reported, Patient   gabapentin (NEURONTIN) 300 MG capsule Take 1,200 mg by mouth every evening (300MG X 4 = 1,200MG)  at PM Yes Reported, Patient   hydrochlorothiazide (HYDRODIURIL) 12.5 MG tablet Take 12.5 mg by mouth daily  at AM Yes Reported, Patient   ketoconazole (NIZORAL) 2 % external cream Apply topically daily as needed for itching  at PRN Yes Reported, Patient   losartan (COZAAR) 50 MG tablet Take 50 mg by mouth daily  at AM Yes Reported, Patient   multivitamin w/minerals (MULTI-VITAMIN) tablet Take 1 tablet by mouth daily  at AM Yes Reported, Patient   ondansetron (ZOFRAN ODT) 4 MG disintegrating tablet Take 1-2 tablets by mouth every 8 hours as needed for nausea.  at PRN Yes Bruce Sheth MD   OVER-THE-COUNTER Take 1 tablet by mouth daily as needed (NERVE PAIN)  Medication Name: P.E.A.  at PM Yes Reported, Patient   risperiDONE (RISPERDAL) 3 MG tablet Take 3 mg by mouth 2 times daily   at PM Yes Reported, Patient   silver sulfADIAZINE (SILVADENE) 1 % cream Apply to wound BID  at PRN Yes Bruce Sheth MD   vitamin C (ASCORBIC ACID) 1000 MG TABS Take 1,000 mg by mouth daily  at AM Yes Reported, Patient   Vitamin D3 (CHOLECALCIFEROL) 25 mcg (1000 units) tablet Take 1 tablet by mouth daily  at AM Yes Reported, Patient

## 2021-12-08 ENCOUNTER — HOSPITAL ENCOUNTER (INPATIENT)
Facility: CLINIC | Age: 46
LOS: 1 days | Discharge: HOME | DRG: 621 | End: 2021-12-09
Attending: SURGERY | Admitting: SURGERY
Payer: COMMERCIAL

## 2021-12-08 ENCOUNTER — ANESTHESIA (OUTPATIENT)
Dept: SURGERY | Facility: CLINIC | Age: 46
DRG: 621 | End: 2021-12-08
Payer: COMMERCIAL

## 2021-12-08 ENCOUNTER — APPOINTMENT (OUTPATIENT)
Dept: SURGERY | Facility: PHYSICIAN GROUP | Age: 46
End: 2021-12-08
Payer: COMMERCIAL

## 2021-12-08 DIAGNOSIS — E66.01 MORBID OBESITY WITH BMI OF 40.0-44.9, ADULT (H): Primary | ICD-10-CM

## 2021-12-08 LAB
CREAT SERPL-MCNC: 0.86 MG/DL (ref 0.52–1.04)
GFR SERPL CREATININE-BSD FRML MDRD: 81 ML/MIN/1.73M2
GLUCOSE BLDC GLUCOMTR-MCNC: 150 MG/DL (ref 70–99)
POTASSIUM BLD-SCNC: 4.2 MMOL/L (ref 3.4–5.3)

## 2021-12-08 PROCEDURE — 250N000011 HC RX IP 250 OP 636: Performed by: PHYSICIAN ASSISTANT

## 2021-12-08 PROCEDURE — 258N000003 HC RX IP 258 OP 636: Performed by: ANESTHESIOLOGY

## 2021-12-08 PROCEDURE — 250N000013 HC RX MED GY IP 250 OP 250 PS 637: Performed by: PHYSICIAN ASSISTANT

## 2021-12-08 PROCEDURE — 0D164ZA BYPASS STOMACH TO JEJUNUM, PERCUTANEOUS ENDOSCOPIC APPROACH: ICD-10-PCS | Performed by: SURGERY

## 2021-12-08 PROCEDURE — 250N000011 HC RX IP 250 OP 636: Performed by: ANESTHESIOLOGY

## 2021-12-08 PROCEDURE — 360N000077 HC SURGERY LEVEL 4, PER MIN: Performed by: SURGERY

## 2021-12-08 PROCEDURE — 999N000141 HC STATISTIC PRE-PROCEDURE NURSING ASSESSMENT: Performed by: SURGERY

## 2021-12-08 PROCEDURE — 250N000013 HC RX MED GY IP 250 OP 250 PS 637: Performed by: NURSE ANESTHETIST, CERTIFIED REGISTERED

## 2021-12-08 PROCEDURE — 250N000026 HC DESFLURANE, PER MIN: Performed by: SURGERY

## 2021-12-08 PROCEDURE — 250N000011 HC RX IP 250 OP 636: Performed by: SURGERY

## 2021-12-08 PROCEDURE — 258N000003 HC RX IP 258 OP 636: Performed by: NURSE ANESTHETIST, CERTIFIED REGISTERED

## 2021-12-08 PROCEDURE — 370N000017 HC ANESTHESIA TECHNICAL FEE, PER MIN: Performed by: SURGERY

## 2021-12-08 PROCEDURE — 43644 LAP GASTRIC BYPASS/ROUX-EN-Y: CPT | Mod: AS | Performed by: PHYSICIAN ASSISTANT

## 2021-12-08 PROCEDURE — 84132 ASSAY OF SERUM POTASSIUM: CPT | Performed by: ANESTHESIOLOGY

## 2021-12-08 PROCEDURE — 250N000009 HC RX 250: Performed by: SURGERY

## 2021-12-08 PROCEDURE — 250N000011 HC RX IP 250 OP 636: Performed by: NURSE ANESTHETIST, CERTIFIED REGISTERED

## 2021-12-08 PROCEDURE — 5A09357 ASSISTANCE WITH RESPIRATORY VENTILATION, LESS THAN 24 CONSECUTIVE HOURS, CONTINUOUS POSITIVE AIRWAY PRESSURE: ICD-10-PCS | Performed by: PHYSICIAN ASSISTANT

## 2021-12-08 PROCEDURE — 82565 ASSAY OF CREATININE: CPT | Performed by: PHYSICIAN ASSISTANT

## 2021-12-08 PROCEDURE — 272N000001 HC OR GENERAL SUPPLY STERILE: Performed by: SURGERY

## 2021-12-08 PROCEDURE — 36415 COLL VENOUS BLD VENIPUNCTURE: CPT | Performed by: PHYSICIAN ASSISTANT

## 2021-12-08 PROCEDURE — 36415 COLL VENOUS BLD VENIPUNCTURE: CPT | Performed by: ANESTHESIOLOGY

## 2021-12-08 PROCEDURE — 258N000003 HC RX IP 258 OP 636: Performed by: PHYSICIAN ASSISTANT

## 2021-12-08 PROCEDURE — 999N000157 HC STATISTIC RCP TIME EA 10 MIN

## 2021-12-08 PROCEDURE — 250N000009 HC RX 250: Performed by: PHYSICIAN ASSISTANT

## 2021-12-08 PROCEDURE — 710N000009 HC RECOVERY PHASE 1, LEVEL 1, PER MIN: Performed by: SURGERY

## 2021-12-08 PROCEDURE — 250N000025 HC SEVOFLURANE, PER MIN: Performed by: SURGERY

## 2021-12-08 PROCEDURE — 120N000001 HC R&B MED SURG/OB

## 2021-12-08 PROCEDURE — 43644 LAP GASTRIC BYPASS/ROUX-EN-Y: CPT | Performed by: SURGERY

## 2021-12-08 PROCEDURE — 258N000001 HC RX 258: Performed by: SURGERY

## 2021-12-08 PROCEDURE — 8E0W4CZ ROBOTIC ASSISTED PROCEDURE OF TRUNK REGION, PERCUTANEOUS ENDOSCOPIC APPROACH: ICD-10-PCS | Performed by: SURGERY

## 2021-12-08 PROCEDURE — 250N000009 HC RX 250: Performed by: NURSE ANESTHETIST, CERTIFIED REGISTERED

## 2021-12-08 RX ORDER — SCOLOPAMINE TRANSDERMAL SYSTEM 1 MG/1
1 PATCH, EXTENDED RELEASE TRANSDERMAL
Status: DISCONTINUED | OUTPATIENT
Start: 2021-12-08 | End: 2021-12-09 | Stop reason: HOSPADM

## 2021-12-08 RX ORDER — SODIUM CHLORIDE, SODIUM LACTATE, POTASSIUM CHLORIDE, CALCIUM CHLORIDE 600; 310; 30; 20 MG/100ML; MG/100ML; MG/100ML; MG/100ML
INJECTION, SOLUTION INTRAVENOUS CONTINUOUS PRN
Status: DISCONTINUED | OUTPATIENT
Start: 2021-12-08 | End: 2021-12-08

## 2021-12-08 RX ORDER — DIPHENHYDRAMINE HYDROCHLORIDE 50 MG/ML
25 INJECTION INTRAMUSCULAR; INTRAVENOUS EVERY 6 HOURS PRN
Status: DISCONTINUED | OUTPATIENT
Start: 2021-12-08 | End: 2021-12-09 | Stop reason: HOSPADM

## 2021-12-08 RX ORDER — ENALAPRILAT 1.25 MG/ML
1.25 INJECTION INTRAVENOUS EVERY 6 HOURS PRN
Status: DISCONTINUED | OUTPATIENT
Start: 2021-12-08 | End: 2021-12-09 | Stop reason: HOSPADM

## 2021-12-08 RX ORDER — SODIUM CHLORIDE, SODIUM LACTATE, POTASSIUM CHLORIDE, CALCIUM CHLORIDE 600; 310; 30; 20 MG/100ML; MG/100ML; MG/100ML; MG/100ML
INJECTION, SOLUTION INTRAVENOUS CONTINUOUS
Status: DISCONTINUED | OUTPATIENT
Start: 2021-12-08 | End: 2021-12-08 | Stop reason: HOSPADM

## 2021-12-08 RX ORDER — ONDANSETRON 4 MG/1
4 TABLET, ORALLY DISINTEGRATING ORAL EVERY 30 MIN PRN
Status: DISCONTINUED | OUTPATIENT
Start: 2021-12-08 | End: 2021-12-08 | Stop reason: HOSPADM

## 2021-12-08 RX ORDER — CEFAZOLIN SODIUM IN 0.9 % NACL 3 G/100 ML
3 INTRAVENOUS SOLUTION, PIGGYBACK (ML) INTRAVENOUS SEE ADMIN INSTRUCTIONS
Status: DISCONTINUED | OUTPATIENT
Start: 2021-12-08 | End: 2021-12-08 | Stop reason: HOSPADM

## 2021-12-08 RX ORDER — FENTANYL CITRATE 50 UG/ML
INJECTION, SOLUTION INTRAMUSCULAR; INTRAVENOUS PRN
Status: DISCONTINUED | OUTPATIENT
Start: 2021-12-08 | End: 2021-12-08

## 2021-12-08 RX ORDER — DIPHENHYDRAMINE HCL 25 MG
25 CAPSULE ORAL EVERY 6 HOURS PRN
Status: DISCONTINUED | OUTPATIENT
Start: 2021-12-08 | End: 2021-12-09 | Stop reason: HOSPADM

## 2021-12-08 RX ORDER — MEPERIDINE HYDROCHLORIDE 25 MG/ML
12.5 INJECTION INTRAMUSCULAR; INTRAVENOUS; SUBCUTANEOUS
Status: DISCONTINUED | OUTPATIENT
Start: 2021-12-08 | End: 2021-12-08 | Stop reason: HOSPADM

## 2021-12-08 RX ORDER — ACETAMINOPHEN 500 MG
1000 TABLET ORAL EVERY 6 HOURS PRN
COMMUNITY

## 2021-12-08 RX ORDER — SODIUM CHLORIDE, SODIUM LACTATE, POTASSIUM CHLORIDE, CALCIUM CHLORIDE 600; 310; 30; 20 MG/100ML; MG/100ML; MG/100ML; MG/100ML
INJECTION, SOLUTION INTRAVENOUS CONTINUOUS
Status: DISCONTINUED | OUTPATIENT
Start: 2021-12-08 | End: 2021-12-09 | Stop reason: HOSPADM

## 2021-12-08 RX ORDER — FENTANYL CITRATE 0.05 MG/ML
25 INJECTION, SOLUTION INTRAMUSCULAR; INTRAVENOUS EVERY 5 MIN PRN
Status: DISCONTINUED | OUTPATIENT
Start: 2021-12-08 | End: 2021-12-08 | Stop reason: HOSPADM

## 2021-12-08 RX ORDER — NALOXONE HYDROCHLORIDE 0.4 MG/ML
0.4 INJECTION, SOLUTION INTRAMUSCULAR; INTRAVENOUS; SUBCUTANEOUS
Status: DISCONTINUED | OUTPATIENT
Start: 2021-12-08 | End: 2021-12-09 | Stop reason: HOSPADM

## 2021-12-08 RX ORDER — DEXAMETHASONE SODIUM PHOSPHATE 4 MG/ML
INJECTION, SOLUTION INTRA-ARTICULAR; INTRALESIONAL; INTRAMUSCULAR; INTRAVENOUS; SOFT TISSUE PRN
Status: DISCONTINUED | OUTPATIENT
Start: 2021-12-08 | End: 2021-12-08

## 2021-12-08 RX ORDER — OXYCODONE HYDROCHLORIDE 5 MG/1
5 TABLET ORAL EVERY 4 HOURS PRN
Status: DISCONTINUED | OUTPATIENT
Start: 2021-12-08 | End: 2021-12-08 | Stop reason: HOSPADM

## 2021-12-08 RX ORDER — HYDRALAZINE HYDROCHLORIDE 20 MG/ML
INJECTION INTRAMUSCULAR; INTRAVENOUS PRN
Status: DISCONTINUED | OUTPATIENT
Start: 2021-12-08 | End: 2021-12-08

## 2021-12-08 RX ORDER — ACETAMINOPHEN 325 MG/1
650 TABLET ORAL EVERY 4 HOURS PRN
Status: DISCONTINUED | OUTPATIENT
Start: 2021-12-08 | End: 2021-12-09 | Stop reason: HOSPADM

## 2021-12-08 RX ORDER — HEPARIN SODIUM 5000 [USP'U]/.5ML
5000 INJECTION, SOLUTION INTRAVENOUS; SUBCUTANEOUS
Status: COMPLETED | OUTPATIENT
Start: 2021-12-08 | End: 2021-12-08

## 2021-12-08 RX ORDER — LIDOCAINE HYDROCHLORIDE 20 MG/ML
INJECTION, SOLUTION INFILTRATION; PERINEURAL PRN
Status: DISCONTINUED | OUTPATIENT
Start: 2021-12-08 | End: 2021-12-08

## 2021-12-08 RX ORDER — KETAMINE HYDROCHLORIDE 10 MG/ML
INJECTION INTRAMUSCULAR; INTRAVENOUS PRN
Status: DISCONTINUED | OUTPATIENT
Start: 2021-12-08 | End: 2021-12-08

## 2021-12-08 RX ORDER — ONDANSETRON 2 MG/ML
INJECTION INTRAMUSCULAR; INTRAVENOUS PRN
Status: DISCONTINUED | OUTPATIENT
Start: 2021-12-08 | End: 2021-12-08

## 2021-12-08 RX ORDER — PROPOFOL 10 MG/ML
INJECTION, EMULSION INTRAVENOUS CONTINUOUS PRN
Status: DISCONTINUED | OUTPATIENT
Start: 2021-12-08 | End: 2021-12-08

## 2021-12-08 RX ORDER — PROPOFOL 10 MG/ML
INJECTION, EMULSION INTRAVENOUS PRN
Status: DISCONTINUED | OUTPATIENT
Start: 2021-12-08 | End: 2021-12-08

## 2021-12-08 RX ORDER — OXYCODONE HYDROCHLORIDE 5 MG/1
10 TABLET ORAL
Status: DISCONTINUED | OUTPATIENT
Start: 2021-12-08 | End: 2021-12-09 | Stop reason: HOSPADM

## 2021-12-08 RX ORDER — NALOXONE HYDROCHLORIDE 0.4 MG/ML
0.2 INJECTION, SOLUTION INTRAMUSCULAR; INTRAVENOUS; SUBCUTANEOUS
Status: DISCONTINUED | OUTPATIENT
Start: 2021-12-08 | End: 2021-12-09 | Stop reason: HOSPADM

## 2021-12-08 RX ORDER — LIDOCAINE 40 MG/G
CREAM TOPICAL
Status: DISCONTINUED | OUTPATIENT
Start: 2021-12-08 | End: 2021-12-09 | Stop reason: HOSPADM

## 2021-12-08 RX ORDER — LABETALOL HYDROCHLORIDE 5 MG/ML
5-10 INJECTION, SOLUTION INTRAVENOUS
Status: DISCONTINUED | OUTPATIENT
Start: 2021-12-08 | End: 2021-12-09 | Stop reason: HOSPADM

## 2021-12-08 RX ORDER — ONDANSETRON 2 MG/ML
4 INJECTION INTRAMUSCULAR; INTRAVENOUS EVERY 6 HOURS PRN
Status: DISCONTINUED | OUTPATIENT
Start: 2021-12-08 | End: 2021-12-09 | Stop reason: HOSPADM

## 2021-12-08 RX ORDER — CEFAZOLIN SODIUM IN 0.9 % NACL 3 G/100 ML
3 INTRAVENOUS SOLUTION, PIGGYBACK (ML) INTRAVENOUS
Status: DISCONTINUED | OUTPATIENT
Start: 2021-12-08 | End: 2021-12-08 | Stop reason: HOSPADM

## 2021-12-08 RX ORDER — ALBUTEROL SULFATE 90 UG/1
1 AEROSOL, METERED RESPIRATORY (INHALATION)
Status: DISCONTINUED | OUTPATIENT
Start: 2021-12-08 | End: 2021-12-09 | Stop reason: HOSPADM

## 2021-12-08 RX ORDER — ALBUTEROL SULFATE 90 UG/1
AEROSOL, METERED RESPIRATORY (INHALATION) PRN
Status: DISCONTINUED | OUTPATIENT
Start: 2021-12-08 | End: 2021-12-08

## 2021-12-08 RX ORDER — ONDANSETRON 4 MG/1
4 TABLET, ORALLY DISINTEGRATING ORAL EVERY 6 HOURS PRN
Status: DISCONTINUED | OUTPATIENT
Start: 2021-12-08 | End: 2021-12-09 | Stop reason: HOSPADM

## 2021-12-08 RX ORDER — SIMETHICONE 40MG/0.6ML
40 SUSPENSION, DROPS(FINAL DOSAGE FORM)(ML) ORAL EVERY 6 HOURS PRN
Status: DISCONTINUED | OUTPATIENT
Start: 2021-12-08 | End: 2021-12-09 | Stop reason: HOSPADM

## 2021-12-08 RX ORDER — HYDROMORPHONE HCL IN WATER/PF 6 MG/30 ML
0.2 PATIENT CONTROLLED ANALGESIA SYRINGE INTRAVENOUS EVERY 5 MIN PRN
Status: DISCONTINUED | OUTPATIENT
Start: 2021-12-08 | End: 2021-12-08 | Stop reason: HOSPADM

## 2021-12-08 RX ORDER — BUPIVACAINE HYDROCHLORIDE AND EPINEPHRINE 2.5; 5 MG/ML; UG/ML
INJECTION, SOLUTION INFILTRATION; PERINEURAL PRN
Status: DISCONTINUED | OUTPATIENT
Start: 2021-12-08 | End: 2021-12-08 | Stop reason: HOSPADM

## 2021-12-08 RX ORDER — ONDANSETRON 2 MG/ML
4 INJECTION INTRAMUSCULAR; INTRAVENOUS EVERY 30 MIN PRN
Status: DISCONTINUED | OUTPATIENT
Start: 2021-12-08 | End: 2021-12-08 | Stop reason: HOSPADM

## 2021-12-08 RX ORDER — MAGNESIUM HYDROXIDE 1200 MG/15ML
LIQUID ORAL PRN
Status: DISCONTINUED | OUTPATIENT
Start: 2021-12-08 | End: 2021-12-08 | Stop reason: HOSPADM

## 2021-12-08 RX ORDER — PROCHLORPERAZINE MALEATE 10 MG
10 TABLET ORAL EVERY 6 HOURS PRN
Status: DISCONTINUED | OUTPATIENT
Start: 2021-12-08 | End: 2021-12-09 | Stop reason: HOSPADM

## 2021-12-08 RX ORDER — HYDROMORPHONE HCL IN WATER/PF 6 MG/30 ML
.2-.3 PATIENT CONTROLLED ANALGESIA SYRINGE INTRAVENOUS
Status: DISCONTINUED | OUTPATIENT
Start: 2021-12-08 | End: 2021-12-09 | Stop reason: HOSPADM

## 2021-12-08 RX ORDER — OXYCODONE HYDROCHLORIDE 5 MG/1
5 TABLET ORAL
Status: DISCONTINUED | OUTPATIENT
Start: 2021-12-08 | End: 2021-12-09 | Stop reason: HOSPADM

## 2021-12-08 RX ADMIN — FENTANYL CITRATE 50 MCG: 50 INJECTION, SOLUTION INTRAMUSCULAR; INTRAVENOUS at 09:30

## 2021-12-08 RX ADMIN — HYDROMORPHONE HYDROCHLORIDE 0.2 MG: 0.2 INJECTION, SOLUTION INTRAMUSCULAR; INTRAVENOUS; SUBCUTANEOUS at 12:36

## 2021-12-08 RX ADMIN — Medication 3 G: at 08:30

## 2021-12-08 RX ADMIN — DEXAMETHASONE SODIUM PHOSPHATE 4 MG: 4 INJECTION, SOLUTION INTRA-ARTICULAR; INTRALESIONAL; INTRAMUSCULAR; INTRAVENOUS; SOFT TISSUE at 09:00

## 2021-12-08 RX ADMIN — HYDROMORPHONE HYDROCHLORIDE 0.5 MG: 1 INJECTION, SOLUTION INTRAMUSCULAR; INTRAVENOUS; SUBCUTANEOUS at 09:00

## 2021-12-08 RX ADMIN — HYDROMORPHONE HYDROCHLORIDE 0.2 MG: 0.2 INJECTION, SOLUTION INTRAMUSCULAR; INTRAVENOUS; SUBCUTANEOUS at 12:23

## 2021-12-08 RX ADMIN — HYDRALAZINE HYDROCHLORIDE 5 MG: 20 INJECTION INTRAMUSCULAR; INTRAVENOUS at 10:22

## 2021-12-08 RX ADMIN — PROPOFOL 25 MCG/KG/MIN: 10 INJECTION, EMULSION INTRAVENOUS at 08:47

## 2021-12-08 RX ADMIN — FENTANYL CITRATE 50 MCG: 50 INJECTION, SOLUTION INTRAMUSCULAR; INTRAVENOUS at 09:34

## 2021-12-08 RX ADMIN — ROCURONIUM BROMIDE 10 MG: 50 INJECTION, SOLUTION INTRAVENOUS at 09:25

## 2021-12-08 RX ADMIN — ALBUTEROL SULFATE 5 PUFF: 90 AEROSOL, METERED RESPIRATORY (INHALATION) at 10:39

## 2021-12-08 RX ADMIN — SCOPALAMINE 1 PATCH: 1 PATCH, EXTENDED RELEASE TRANSDERMAL at 14:50

## 2021-12-08 RX ADMIN — Medication 10 MG: at 09:43

## 2021-12-08 RX ADMIN — HYDROMORPHONE HYDROCHLORIDE 0.2 MG: 0.2 INJECTION, SOLUTION INTRAMUSCULAR; INTRAVENOUS; SUBCUTANEOUS at 14:01

## 2021-12-08 RX ADMIN — ROCURONIUM BROMIDE 10 MG: 50 INJECTION, SOLUTION INTRAVENOUS at 09:44

## 2021-12-08 RX ADMIN — SUGAMMADEX 200 MG: 100 INJECTION, SOLUTION INTRAVENOUS at 10:41

## 2021-12-08 RX ADMIN — DEXMEDETOMIDINE HYDROCHLORIDE 12 MCG: 100 INJECTION, SOLUTION INTRAVENOUS at 09:18

## 2021-12-08 RX ADMIN — ROCURONIUM BROMIDE 20 MG: 50 INJECTION, SOLUTION INTRAVENOUS at 10:05

## 2021-12-08 RX ADMIN — HEPARIN SODIUM 5000 UNITS: 5000 INJECTION INTRAVENOUS; SUBCUTANEOUS at 07:15

## 2021-12-08 RX ADMIN — FENTANYL CITRATE 100 MCG: 50 INJECTION, SOLUTION INTRAMUSCULAR; INTRAVENOUS at 08:37

## 2021-12-08 RX ADMIN — SODIUM CHLORIDE, POTASSIUM CHLORIDE, SODIUM LACTATE AND CALCIUM CHLORIDE: 600; 310; 30; 20 INJECTION, SOLUTION INTRAVENOUS at 08:40

## 2021-12-08 RX ADMIN — ALBUTEROL SULFATE 5 PUFF: 90 AEROSOL, METERED RESPIRATORY (INHALATION) at 08:50

## 2021-12-08 RX ADMIN — PROPOFOL 240 MG: 10 INJECTION, EMULSION INTRAVENOUS at 08:37

## 2021-12-08 RX ADMIN — ROCURONIUM BROMIDE 20 MG: 50 INJECTION, SOLUTION INTRAVENOUS at 09:07

## 2021-12-08 RX ADMIN — Medication 20 MG: at 08:53

## 2021-12-08 RX ADMIN — LIDOCAINE HYDROCHLORIDE 100 MG: 20 INJECTION, SOLUTION INFILTRATION; PERINEURAL at 08:37

## 2021-12-08 RX ADMIN — MIDAZOLAM 2 MG: 1 INJECTION INTRAMUSCULAR; INTRAVENOUS at 08:30

## 2021-12-08 RX ADMIN — SODIUM CHLORIDE, POTASSIUM CHLORIDE, SODIUM LACTATE AND CALCIUM CHLORIDE: 600; 310; 30; 20 INJECTION, SOLUTION INTRAVENOUS at 07:07

## 2021-12-08 RX ADMIN — FENTANYL CITRATE 50 MCG: 50 INJECTION, SOLUTION INTRAMUSCULAR; INTRAVENOUS at 10:42

## 2021-12-08 RX ADMIN — ROCURONIUM BROMIDE 60 MG: 50 INJECTION, SOLUTION INTRAVENOUS at 08:37

## 2021-12-08 RX ADMIN — SODIUM CHLORIDE, POTASSIUM CHLORIDE, SODIUM LACTATE AND CALCIUM CHLORIDE: 600; 310; 30; 20 INJECTION, SOLUTION INTRAVENOUS at 16:02

## 2021-12-08 RX ADMIN — ACETAMINOPHEN 650 MG: 325 TABLET, FILM COATED ORAL at 22:53

## 2021-12-08 RX ADMIN — ONDANSETRON 4 MG: 2 INJECTION INTRAMUSCULAR; INTRAVENOUS at 10:54

## 2021-12-08 RX ADMIN — HYDROMORPHONE HYDROCHLORIDE 0.2 MG: 0.2 INJECTION, SOLUTION INTRAMUSCULAR; INTRAVENOUS; SUBCUTANEOUS at 17:45

## 2021-12-08 RX ADMIN — DEXMEDETOMIDINE HYDROCHLORIDE 8 MCG: 100 INJECTION, SOLUTION INTRAVENOUS at 09:29

## 2021-12-08 RX ADMIN — SIMETHICONE 40 MG: 20 EMULSION ORAL at 20:02

## 2021-12-08 RX ADMIN — PHENYLEPHRINE HYDROCHLORIDE 0.2 MCG/KG/MIN: 10 INJECTION INTRAVENOUS at 09:09

## 2021-12-08 RX ADMIN — ONDANSETRON 4 MG: 2 INJECTION INTRAMUSCULAR; INTRAVENOUS at 17:44

## 2021-12-08 ASSESSMENT — ACTIVITIES OF DAILY LIVING (ADL)
ADLS_ACUITY_SCORE: 6
ADLS_ACUITY_SCORE: 6
ADLS_ACUITY_SCORE: 4
ADLS_ACUITY_SCORE: 4
ADLS_ACUITY_SCORE: 6
ADLS_ACUITY_SCORE: 4
ADLS_ACUITY_SCORE: 6
ADLS_ACUITY_SCORE: 6
ADLS_ACUITY_SCORE: 4
ADLS_ACUITY_SCORE: 6
ADLS_ACUITY_SCORE: 12
ADLS_ACUITY_SCORE: 6
ADLS_ACUITY_SCORE: 6

## 2021-12-08 ASSESSMENT — MIFFLIN-ST. JEOR: SCORE: 1850.61

## 2021-12-08 NOTE — ANESTHESIA PROCEDURE NOTES
Airway       Patient location during procedure: OR       Procedure Start/Stop Times: 12/8/2021 8:41 AM  Staff -        CRNA: Krunal Samuels APRN CRNA       Performed By: CRNA  Consent for Airway        Urgency: elective  Indications and Patient Condition       Indications for airway management: gordon-procedural       Induction type:intravenous       Mask difficulty assessment: 2 - vent by mask + OA or adjuvant +/- NMBA    Final Airway Details       Final airway type: endotracheal airway       Successful airway: ETT - single  Endotracheal Airway Details        ETT size (mm): 7.0       Successful intubation technique: video laryngoscopy       VL Blade Size: Glidescope 3       Grade View of Cords: 1       Adjucts: stylet       Position: Right       Measured from: gums/teeth       Secured at (cm): 22       Bite block used: None    Post intubation assessment        Placement verified by: capnometry, equal breath sounds and chest rise        Number of attempts at approach: 1       Number of other approaches attempted: 0       Secured with: pink tape       Ease of procedure: easy       Dentition: Intact and Unchanged

## 2021-12-08 NOTE — ANESTHESIA POSTPROCEDURE EVALUATION
Patient: Leanne Funes    Procedure: Procedure(s):  ROBOTIC ASSISTED LAPAROSCOPIC GASTRIC BYPASS       Diagnosis:Morbidly obese (H) [E66.01]  Diagnosis Additional Information: No value filed.    Anesthesia Type:  General    Note:  Disposition: Inpatient   Postop Pain Control: Uneventful            Sign Out: Well controlled pain   PONV: No   Neuro/Psych: Uneventful            Sign Out: Acceptable/Baseline neuro status   Airway/Respiratory: Uneventful            Sign Out: Acceptable/Baseline resp. status   CV/Hemodynamics: Uneventful            Sign Out: Acceptable CV status   Other NRE: NONE   DID A NON-ROUTINE EVENT OCCUR? No           Last vitals:  Vitals Value Taken Time   /70 12/08/21 1250   Temp     Pulse 105 12/08/21 1251   Resp 15 12/08/21 1251   SpO2 95 % 12/08/21 1252   Vitals shown include unvalidated device data.    Electronically Signed By: Rikki Greene MD  December 8, 2021  4:38 PM

## 2021-12-08 NOTE — ANESTHESIA CARE TRANSFER NOTE
Patient: Leanne Funes    Procedure: Procedure(s):  ROBOTIC ASSISTED LAPAROSCOPIC GASTRIC BYPASS       Diagnosis: Morbidly obese (H) [E66.01]  Diagnosis Additional Information: No value filed.    Anesthesia Type:   General     Note:    Oropharynx: nasal airway in place and spontaneously breathing  Level of Consciousness: drowsy  Oxygen Supplementation: face mask  Level of Supplemental Oxygen (L/min / FiO2): 8  Independent Airway: airway patency satisfactory and stable  Dentition: dentition unchanged  Vital Signs Stable: post-procedure vital signs reviewed and stable  Report to RN Given: handoff report given  Patient transferred to: PACU    Handoff Report: Identifed the Patient, Identified the Reponsible Provider, Reviewed the pertinent medical history, Discussed the surgical course, Reviewed Intra-OP anesthesia mangement and issues during anesthesia, Set expectations for post-procedure period and Allowed opportunity for questions and acknowledgement of understanding      Vitals:  Vitals Value Taken Time   /85 12/08/21 1111   Temp     Pulse 107 12/08/21 1113   Resp 17 12/08/21 1113   SpO2 96 % 12/08/21 1113   Vitals shown include unvalidated device data.    Electronically Signed By: HERSON Lee CRNA  December 8, 2021  11:14 AM

## 2021-12-08 NOTE — OP NOTE
PREOPERATIVE DIAGNOSIS: Morbid obesity with  medical comorbidities including prediabetes, obstructive sleep apnea, high blood pressure, and stress urinary incontinence.  Failure of other methods of permanent weight loss including portion control and calorie counting, exercise, Atkins diet.  BMI at the time of surgeon consultation of 44.99    POSTOPERATIVE DIAGNOSIS: same     PROCEDURE: Robotic assisted laparoscopic Ileana-en-Y gastric bypass.     SURGEON: Taiwo Gomez MD     ASSISTANT: Pato Ovalle PA-C, Physician assistant first assistant was necessary during the performance of this procedure for expertise in patient positioning, prepping, draping, trocar placement, camera management, retraction and exposure, and suctioning.    ANESTHESIA: General endotracheal.     ESTIMATED BLOOD LOSS: 10 cc.     DRAINS: None.     COMPLICATIONS: None.     SPECIMENS: None.     OPERATIVE INDICATIONS: Leanne Funes has undergone the preoperative screening process through the [facility] and has been deemed an appropriate candidate for bariatric surgery. She  was furnished with a copy of our specialized consent form for said procedure. This document was reviewed with her  in great detail. After thoroughly discussing the procedures, potential risks and anticipated outcome she  wished to proceed.   Moreover, as the surgeon performing this procedure, I certify that the following are true in regards to this patient at or prior to the day of surgery:   1. The patient's body mass index (BMI) is or has been greater than or equal to 35 kg/m2.  2. The patient has at least one co-morbidity related to obesity (as outlined above).  3. The patient has been previously unsuccessful with medical treatment for obesity.  Please note that some of this information has been documented as part of a comprehensive pre-bariatric surgery process in the outpatient clinic and is NOT immediately available in the inpatient encounter for  this bariatric operation.    DESCRIPTION OF PROCEDURE: After informed consent was obtained, the patient was taken to the operating room and placed supine on the operating table. Following the induction of adequate general endotracheal anesthesia, the patient's abdomen was shaved, prepped and draped in the usual fashion. A surgical timeout was initiated and completed. Appropriate IV antibiotics as well as subq heparin were administered. Skin incision was then made to the left of the umbilicus in the mid abdomen and a 5 mm optical trocar was used to gain access to the peritoneal cavity. Carbon dioxide pneumoperitoneum was then established.  Under direct vision a Emmanuel liver retractor was then introduced through a subxiphoid stab incision and used to elevate the left lobe of the liver.  Under direct vision the following ports were then placed: 12 mm robotic port was placed in the right mid abdomen, inferior and medial to this an 11 mm assistant port was placed, an 8 mm port was placed left lateral to the initial 5 mm port, an 8 mm port was placed in the left anterior axillary line, finally the initial 5 mm port was exchanged for an 8 mm robotic port.  The robot was then uneventfully docked and I assumed control of the surgeon console.  Starting in the upper abdomen the angle of Hiss was taken down.  I then dissected along the lesser curvature the stomach and gain access to the retrogastric space.  36 Faroese VISI G-tube was then introduced transorally into the upper stomach.  This was used as a means of sizing the gastric pouch.  Starting along the lesser curvature of the stomach the 60 mm blue load stapler was fired transversely.  Then using the G-tube is a guide additional firings were placed vertically to complete a completely divided gastric pouch.  With this then completed the G-tube was backed up into the esophagus.  The greater omentum was grasped and elevated.  This was divided from its distal tip to the  level of the transverse colon.  The transverse colon was then reflected superiorly and the small bowel was run proximally to the ligament of Treitz.  60 cm distal to ligament of Treitz a loop omega limb was brought up in an antecolic/antigastric manner.  I then proceeded with 2 layer handsewn gastrojejunostomy.  Initial posterior layer of suture was placed using 2-0 PDS strata fix suture.  A gastrotomy as well as enterotomy was then created using monopolar scissors.  The enterotomy in both the stomach pouch and the small bowel was large enough to accommodate the 36 Ivorian VISI G-tube.  Posterior mucosal layer of sutures was then placed using 2-0 PDS strata fix suture.  I then initiated anterior mucosal closure again with 2-0 PDS strata fix suture.  When this was nearly closed the VISI G-tube was passed through the anastomosis and into the proximal small bowel and directed down what would be, are bypass Ileana limb in the omega limb that had been brought up.  The anterior mucosal closure was then completed.  Second layer closure was performed over this with 2-0 PDS strata fix suture.  Proximal to the gastrojejunostomy the omega limb was then divided using the 60 mm blue load stapler.  Leak test was then performed using immunofluorescence.  There was no evidence of leakage.  I then measured out 125 cm Ileana limb and a side-to-side jejunojejunostomy was created using the 60 mm blue load stapler.  The enterotomies for the stapler were sutured closed using 2-0 PDS strata fix suture.  The mesenteric defect was closed using running 2-0 silk suture.  This completed our Ileana-en-Y gastric bypass, the robot was then undocked.  The fascia at the 12 mm port sites were closed using a fascial closure instrument and 0 Vicryl tie. Trocars as well as Emmanuel retractor were all then removed. Carbon dioxide was massaged from the abdomen. Local anesthetic was injected at the incision sites and the incisions were all closed with  subcuticular 4-0 Vicryl stitches. Benzoin and Steri-Strips were applied. Needle and sponge counts were correct. The patient tolerated this without difficulty, was awakened in the operating room and taken to the recovery room in stable condition.     ELEAZAR SAN MD

## 2021-12-08 NOTE — PHARMACY-CONSULT NOTE
Bariatric Consult    Medications evaluated as requested per Bariatric Consult. Medications available as liquid formulations have been dispensed when possible.    No medication changes were needed based on the Bariatric Medication Management Policy.   Duloxetine can be swallowed whole if appropriate or capsule can be opened and the contents can be sprinkled onto applesauce or in apple juice only  Gabapentin capsules can be opened and contents mixed with food or juice  Hydrochlorothiazide tablets can be crushed   Losartan tablet can be crushed  Risperidone does contain a film coating and the coating does not crush well - This is available as an ODT tablet if needed  Supplements - can take as ordered depending in size or change to chewable if needed    The pharmacist will continue to follow as new medications are ordered.

## 2021-12-08 NOTE — ANESTHESIA PREPROCEDURE EVALUATION
Anesthesia Pre-Procedure Evaluation    Patient: Leanne Funes   MRN: [id number] : 1975        Preoperative Diagnosis: Morbidly obese (H) [E66.01]    Procedure : Procedure(s):  ROBOTIC ASSISTED LAPAROSCOPIC GASTRIC BYPASS          Past Medical History:   Diagnosis Date     Bipolar affective disorder, currently depressed, moderate (H) 2018     Complex regional pain syndrome type 1 of right upper extremity      Essential hypertension 2017    Formatting of this note might be different from the original. Hypertension (HTN) Chronic     RYAN (obstructive sleep apnea)      Prediabetes 2021      Past Surgical History:   Procedure Laterality Date     C VAGINAL HYSTERECTOMY  2001    Hysterectomy, Vaginal. right ovary no tremoved.     HC LAPAROSCOPY, SURGICAL; APPENDECTOMY  11     HC LAPAROSCOPY, SURGICAL; CHOLECYSTECTOMY  11      Allergies   Allergen Reactions     Codeine Sulfate Nausea and Vomiting      Social History     Tobacco Use     Smoking status: Former Smoker     Packs/day: 1.00     Types: Cigarettes     Smokeless tobacco: Never Used     Tobacco comment: 11-declines Quit Smoke   Substance Use Topics     Alcohol use: Not on file      Wt Readings from Last 1 Encounters:   10/07/21 122.7 kg (270 lb 9.6 oz)        Anesthesia Evaluation   Pt has had prior anesthetic. Type: General.    No history of anesthetic complications       ROS/MED HX  ENT/Pulmonary:     (+) sleep apnea, moderate, uses CPAP,  (-) tobacco use, asthma and COPD   Neurologic:     (+) peripheral neuropathy, - CRPS RUE, cervical spine stimulator present.     Cardiovascular:     (+) hypertension----- (-) murmur   METS/Exercise Tolerance:     Hematologic:  - neg hematologic  ROS     Musculoskeletal:       GI/Hepatic:    (-) GERD and liver disease   Renal/Genitourinary:    (-) renal disease   Endo:     (+) Obesity,  (-) Type I DM and Type II DM   Psychiatric/Substance Use:     (+) psychiatric history bipolar      Infectious Disease:       Malignancy:       Other:            Physical Exam    Airway        Mallampati: IV   TM distance: > 3 FB   Neck ROM: full   Mouth opening: > 3 cm    Respiratory Devices and Support         Dental       (+) chipped    B=Bridge, C=Chipped, L=Loose, M=Missing    Cardiovascular          Rhythm and rate: regular and normal (-) no murmur    Pulmonary           breath sounds clear to auscultation           OUTSIDE LABS:  CBC:   Lab Results   Component Value Date    WBC 11.7 (H) 05/17/2021    WBC 6.8 07/07/2012    HGB 12.8 05/17/2021    HGB 12.4 07/07/2012    HCT 39.7 05/17/2021    HCT 37.5 07/07/2012     05/17/2021     07/07/2012     BMP:   Lab Results   Component Value Date     05/17/2021     07/07/2012    POTASSIUM 4.1 05/17/2021    POTASSIUM 3.9 07/07/2012    CHLORIDE 103 05/17/2021    CHLORIDE 108 07/07/2012    CO2 28 05/17/2021    CO2 24 07/07/2012    BUN 14 05/17/2021    BUN 14 07/07/2012    CR 0.86 05/17/2021    CR 0.81 07/07/2012     (H) 05/17/2021     (H) 07/07/2012     COAGS: No results found for: PTT, INR, FIBR  POC: No results found for: BGM, HCG, HCGS  HEPATIC:   Lab Results   Component Value Date    ALBUMIN 3.6 05/17/2021    PROTTOTAL 7.4 05/17/2021    ALT 46 05/17/2021    AST 24 05/17/2021    ALKPHOS 101 05/17/2021    BILITOTAL 0.2 05/17/2021     OTHER:   Lab Results   Component Value Date    A1C 6.0 (H) 05/17/2021    REJI 9.1 05/17/2021    CRP 21.9 (H) 06/26/2012    SED 10 06/23/2012       Anesthesia Plan    ASA Status:  3   NPO Status:  NPO Appropriate    Anesthesia Type: General.     - Airway: ETT   Induction: Intravenous.   Maintenance: Balanced.   Techniques and Equipment:     - AVOID: No BP/IV in RUE     - Airway: Video-Laryngoscope     - Lines/Monitors: 2nd IV     Consents    Anesthesia Plan(s) and associated risks, benefits, and realistic alternatives discussed. Questions answered and patient/representative(s) expressed  understanding.    - Discussed:     - Discussed with:  Patient      - Extended Intubation/Ventilatory Support Discussed: No.      - Patient is DNR/DNI Status: No    Use of blood products discussed: Yes.     - Discussed with: Patient.     - Consented: consented to blood products            Reason for refusal: other.     Postoperative Care    Pain management: IV analgesics, Multi-modal analgesia.     - Plan for long acting post-op opioid use   PONV prophylaxis: Ondansetron (or other 5HT-3), Dexamethasone or Solumedrol, Background Propofol Infusion     Comments:                Rikki Greene MD

## 2021-12-08 NOTE — BRIEF OP NOTE
[facility]    Brief Operative Note    Pre-operative diagnosis: Morbidly obese (H) [E66.01]  Post-operative diagnosis Same as pre-operative diagnosis    Procedure: Procedure(s):  ROBOTIC ASSISTED LAPAROSCOPIC GASTRIC BYPASS  Surgeon: Surgeon(s) and Role:     * Taiwo Gomez MD - Primary     * Pato Ovalle PA-C - Assisting  Anesthesia: General   Estimated Blood Loss: 10 mL from 12/8/2021  8:30 AM to 12/8/2021 11:08 AM      Drains: None  Specimens: * No specimens in log *  Findings:   Robotic rnygb as scheduled. Steatosis noted.  Complications: None.  Implants: * No implants in log *      Pato Ovalle PA-C

## 2021-12-09 VITALS
RESPIRATION RATE: 16 BRPM | SYSTOLIC BLOOD PRESSURE: 147 MMHG | BODY MASS INDEX: 42.14 KG/M2 | TEMPERATURE: 97.7 F | OXYGEN SATURATION: 92 % | HEIGHT: 66 IN | DIASTOLIC BLOOD PRESSURE: 82 MMHG | HEART RATE: 78 BPM | WEIGHT: 262.2 LBS

## 2021-12-09 LAB
ANION GAP SERPL CALCULATED.3IONS-SCNC: 8 MMOL/L (ref 3–14)
CHLORIDE BLD-SCNC: 107 MMOL/L (ref 94–109)
CO2 SERPL-SCNC: 23 MMOL/L (ref 20–32)
GLUCOSE BLD-MCNC: 109 MG/DL (ref 70–99)
HGB BLD-MCNC: 12.4 G/DL (ref 11.7–15.7)
POTASSIUM BLD-SCNC: 3.8 MMOL/L (ref 3.4–5.3)
SODIUM SERPL-SCNC: 138 MMOL/L (ref 133–144)

## 2021-12-09 PROCEDURE — 80051 ELECTROLYTE PANEL: CPT | Performed by: PHYSICIAN ASSISTANT

## 2021-12-09 PROCEDURE — 82947 ASSAY GLUCOSE BLOOD QUANT: CPT | Performed by: SURGERY

## 2021-12-09 PROCEDURE — 250N000013 HC RX MED GY IP 250 OP 250 PS 637: Performed by: PHYSICIAN ASSISTANT

## 2021-12-09 PROCEDURE — 258N000003 HC RX IP 258 OP 636: Performed by: PHYSICIAN ASSISTANT

## 2021-12-09 PROCEDURE — 85018 HEMOGLOBIN: CPT | Performed by: PHYSICIAN ASSISTANT

## 2021-12-09 PROCEDURE — 36415 COLL VENOUS BLD VENIPUNCTURE: CPT | Performed by: PHYSICIAN ASSISTANT

## 2021-12-09 PROCEDURE — 250N000011 HC RX IP 250 OP 636: Performed by: PHYSICIAN ASSISTANT

## 2021-12-09 RX ORDER — OXYCODONE HYDROCHLORIDE 5 MG/1
5 TABLET ORAL
Qty: 18 TABLET | Refills: 0 | Status: SHIPPED | OUTPATIENT
Start: 2021-12-09 | End: 2021-12-22

## 2021-12-09 RX ORDER — SIMETHICONE 40MG/0.6ML
40 SUSPENSION, DROPS(FINAL DOSAGE FORM)(ML) ORAL EVERY 6 HOURS PRN
Qty: 30 ML | Refills: 0 | Status: SHIPPED | OUTPATIENT
Start: 2021-12-09 | End: 2022-09-08

## 2021-12-09 RX ORDER — RISPERIDONE 3 MG/1
3 TABLET ORAL 2 TIMES DAILY
Status: CANCELLED | OUTPATIENT
Start: 2021-12-09

## 2021-12-09 RX ORDER — MULTIPLE VITAMINS W/ MINERALS TAB 9MG-400MCG
1 TAB ORAL DAILY
Status: CANCELLED | OUTPATIENT
Start: 2021-12-09

## 2021-12-09 RX ORDER — FENTANYL CITRATE 50 UG/ML
25 INJECTION, SOLUTION INTRAMUSCULAR; INTRAVENOUS
Status: DISCONTINUED | OUTPATIENT
Start: 2021-12-09 | End: 2021-12-09

## 2021-12-09 RX ORDER — GABAPENTIN 300 MG/1
300 CAPSULE ORAL 3 TIMES DAILY PRN
Status: DISCONTINUED | OUTPATIENT
Start: 2021-12-09 | End: 2021-12-09 | Stop reason: HOSPADM

## 2021-12-09 RX ORDER — DULOXETIN HYDROCHLORIDE 60 MG/1
60 CAPSULE, DELAYED RELEASE ORAL 2 TIMES DAILY
Status: CANCELLED | OUTPATIENT
Start: 2021-12-09

## 2021-12-09 RX ORDER — LOSARTAN POTASSIUM 50 MG/1
50 TABLET ORAL DAILY
Status: CANCELLED | OUTPATIENT
Start: 2021-12-09

## 2021-12-09 RX ORDER — ONDANSETRON 4 MG/1
4 TABLET, ORALLY DISINTEGRATING ORAL EVERY 6 HOURS PRN
Qty: 30 TABLET | Refills: 0 | Status: SHIPPED | OUTPATIENT
Start: 2021-12-09 | End: 2022-09-08

## 2021-12-09 RX ADMIN — SODIUM CHLORIDE, POTASSIUM CHLORIDE, SODIUM LACTATE AND CALCIUM CHLORIDE: 600; 310; 30; 20 INJECTION, SOLUTION INTRAVENOUS at 01:13

## 2021-12-09 RX ADMIN — SODIUM CHLORIDE, POTASSIUM CHLORIDE, SODIUM LACTATE AND CALCIUM CHLORIDE: 600; 310; 30; 20 INJECTION, SOLUTION INTRAVENOUS at 08:26

## 2021-12-09 RX ADMIN — OMEPRAZOLE 20 MG: 20 CAPSULE, DELAYED RELEASE ORAL at 05:38

## 2021-12-09 RX ADMIN — ONDANSETRON 4 MG: 2 INJECTION INTRAMUSCULAR; INTRAVENOUS at 01:12

## 2021-12-09 RX ADMIN — OXYCODONE HYDROCHLORIDE 10 MG: 5 TABLET ORAL at 01:11

## 2021-12-09 RX ADMIN — ACETAMINOPHEN 650 MG: 325 TABLET, FILM COATED ORAL at 09:57

## 2021-12-09 RX ADMIN — ENOXAPARIN SODIUM 40 MG: 40 INJECTION SUBCUTANEOUS at 09:57

## 2021-12-09 RX ADMIN — ONDANSETRON 4 MG: 4 TABLET, ORALLY DISINTEGRATING ORAL at 15:23

## 2021-12-09 RX ADMIN — OXYCODONE HYDROCHLORIDE 10 MG: 5 TABLET ORAL at 15:23

## 2021-12-09 RX ADMIN — ONDANSETRON 4 MG: 2 INJECTION INTRAMUSCULAR; INTRAVENOUS at 09:18

## 2021-12-09 ASSESSMENT — ACTIVITIES OF DAILY LIVING (ADL)
ADLS_ACUITY_SCORE: 6

## 2021-12-09 ASSESSMENT — MIFFLIN-ST. JEOR: SCORE: 1841.08

## 2021-12-09 NOTE — DISCHARGE INSTRUCTIONS
After Bariatric Surgery Discharge Instructions  [facility]     Note: Ask your nurse to order your medications from the pharmacy. Be sure you have your medications with you when you leave.    What should my diet be for the first 2 weeks after surgery?    Follow the bariatric diet the dietitian discussed with you.    How much fluid should I drink?    Strive for 48-64 ounces daily.    Carry a water bottle with you without a straw or sports top. Drink from it often.    Keep track of how much fluid you drink in a day.    Remember:  -Do not use straws, chew gum or suck on hard candies. They may cause painful gas.    -Sip, don't slurp when you drink.    -Practice small sips using a medicine cup for the first week postop.   -No ice or cold drinks. This could cause gas or spasms.   -No coffee, soda pop or drinks with caffeine. These may cause stomach pain.   -No alcohol. It is bad for your liver and will cause stomach pain.    How often should I do my deep breathing and coughing?    Use your incentive spirometer (small plastic breathing device) every hour while awake after you get home. Using the incentive spirometer helps you deep breath. Continuing to cough and deep breath will help prevent fevers and pneumonia.     If you do not have a fever after one week, you can stop using the incentive spirometer and discard it.       You can continue to take deep breaths without the incentive spirometer every one to two hours while awake for the month after surgery.    What kinds of activity can I do?    Get plenty of rest the first few days after surgery and try to balance rest and activity. You will need some time to recover - you may be more tired than you realize at first. You'll feel better and heal faster if you take good care of yourself.  For 4 weeks after surgery (Please review restrictions at your one week visit, they could change based on how well you are doing):  -Don't lift more  than 20 pounds.   -Take 4-5 short walks every day.  -Don't jog, run, or do belly exercises.    Don't swim, bathe or use a hot tub until your cuts are healed (scabs are gone).  You may shower    Don't plan to fly or take a road trip within the first 1 to 3 months after surgery.  You could get a blood clot in your legs. If you must travel, get up and move around every hour for at least 5 minutes before continuing on your journey.    Your care team can help you decide when it's safe for you to travel.     What can I do for pain control?  You had major belly surgery that involves all layers of your belly muscles. Pain is expected, even for some as far out as 6-8 weeks after surgery. Moving, sneezing, coughing, and breathing will cause discomfort because these activities use your belly muscles.     Please see your after visit summary medication review for what pain medication will be continued, discontinued and newly started for you.      You can take opioid pain medicine if prescribed and if needed. Try to wean off from it as soon as you feel comfortable.     Do not drive while you are taking opioid pain medicine. This is dangerous.    You can take acetaminophen (Tylenol) between your prescribed pain medicine on a scheduled basis OR take it scheduled every 6 hours (check with your care team for specifics).  -Acetaminophen formulation options:    -Liquid   -Caplet (Cut caplet in half before taking)   -Do not take more than 3000 mg Tylenol in a 24 hour period.  -You may also take Tylenol for pain in place of the opioid pain medicine (check with your care team for specifics).     You can apply ice or heat to the affected area(s). Just remember to wrap the ice in something and limit icing sessions to 20 minutes. Excessive icing can irritate the skin or cause skin damage.    You can apply heat with a hot, wet towel or heating pad. Just like cold therapy, limit heat application to 20 minutes. Never sleep with a heating pad  on. It could cause severe burns to your skin.    Wear your binder to support your belly muscles if you have one.  Take this off a little more each day and try to be off completely by 2 weeks after surgery. If you don't need your binder for comfort or support, you don't need to wear it.     You may not be able to sleep in a comfortable position for a few weeks after surgery. This is normal. You may be more comfortable sleeping in a recliner or propped up with 3 pillows for the first couple of weeks after surgery.    Do not take NSAID's (Non-Steroidal Anti-Inflammatory Drugs) (examples: ibuprofen, Motrin, Advil, Aleve, and Naproxen), aspirin, or use pain patches with NSAID's. They will increase your risk of bleeding or getting an ulcer.    Please call the clinic for any of the following pain concerns, we would like to talk to you:  -pain that does not improve with rest  -pain that gets worse and worse  -pain that is not controlled by your pain medicine  -a sudden severe increase in pain    What medications will I need to take after surgery?    You may be discharged with the following types of medications: antacids, pain medications, anti-nausea medications, etc.  Please see your after visit summary medication review for what will be continued, discontinued and newly started.    It is important to reduce the amount of acid in your new stomach for a couple of months after surgery while it is still healing. We will prescribe an acid reducer or antacid. Take it as directed. This will help prevent ulcers, heartburn and acid reflux.    If you took an acid reducer before you had surgery, your care team will let you know what acid reducer you will take after surgery.     It is okay to swallow any medications smaller than   inch in size.   -If it is larger than   inch, it may need to be cut, crushed, or in a liquid form. Check with your care team about which way is most appropriate for you and your medications.    What should  I know about my incisions (cuts)?  Your incisions are covered with white steri strips or butterfly tape and have band aids or gauze over the top. If you have gauze or band aids, they can come off in the hospital.    Leave your steri strips on until they fall off on their own. If the steri strips don't fall off after 1 to 2 weeks, you can take them off. If they fall off earlier, replace them with clean band aids trying to avoid touching the incision itself.     If you have gauze covered by a clear dressing, remove 2-3 days after surgery or as directed by your care team.    You may shower in the hospital after surgery and can get your incision coverings wet.    Do not submerge in water (e.g. No baths, swimming pools, hot tubs) until your care team tells you it is okay and your incisions are completely healed.    Call the clinic if you have any of these signs or symptoms of infection:  -Redness around the site.  -Drainage that smells bad.  -Drainage that is thick yellow or green.  -An increase in pain around the incision site  -An increase in swelling around the incision site  -Heat or warmth around incision site   -Fever of 101.5  F (38.3  C) or higher when taken under the tongue.    -Chills    Will my urine or bowel movements change?   Your first bowel movements (stools) will likely be liquid. You may also notice old blood or a darker color (black or maroon color) in your bowel movements.  This is not unusual and usually goes away after the first week, if not sooner. You may not have a bowel movement for a week.     If you have not had a bowel movement for at least three days after your surgery date and are passing gas, you can use over the counter stool softeners.  Please stop taking the stool softeners and laxatives if your stools are loose.    Increasing fluids and activity as well as getting off narcotics will help prevent constipation.    Call the clinic if:   -You have stomach pain.   -You continue to have  "constipation.  -You have excessive bloating after walking and passing gas.    How can I prevent dehydration if I feel nauseated (sick to my stomach) and vomit (throw up)?     Vomiting is not normal after surgery. If you continue to have nausea and vomiting, call the clinic.     Nausea can be a sign of dehydration. That is why it is very important to track your fluids.    Do not nap more than one hour during the day. Set a timer to wake yourself up, if needed. Too much sleep will keep you from drinking enough fluid during the day and lead to dehydration.    No outside activity in hot, humid weather until you can drink 48 to 64 ounces of fluid in 24 hours. If you sweat a lot, your body may lose too much water.    Try to take a 1 ounce sip of water (one medicine cup) every 15 minutes.  Set a timer to remind yourself.    Call the clinic if you have any of these signs or symptoms of dehydration:  -Dark colored urine  -Urinating (pass water) less than 2-3 times per day  -Lack of energy  -Nausea  -Dizziness  -Headache    Call the clinic ANY TIME at [phone] if:  -Your pain medicine is not working.  -You have a fever ? 101.5 F.  -You have belly or left shoulder pain that gets worse and worse.  -You have a swollen leg with redness, warmth, or pain behind the knee or calf.  -You have chest pain   -You feel very short of breath.  -You have a sudden severe increase in heart rate.  -You have vomiting that gets worse and worse.  -You have constant nausea (feeling sick to your stomach) that does not go away with medication.  -You have trouble swallowing.  -You have an increasing feeling that \"something is not right\".  -You have hiccups that do not stop.  -You have any questions or concerns.    If you have to go to the Emergency Room, we prefer you go to the hospital that did your surgery. Please let them know that you had bariatric surgery and to notify your surgeon.    When should I go back to the clinic?    Follow up with " your care team in 1-2 weeks.     If this appointment was not already made, please call: [phone]

## 2021-12-09 NOTE — PLAN OF CARE
A/O X4, VSS on RA, Ind, ambulating in room and gutierrez, tolerating reg diet, nausea this AM; PRN Zofran given; effective, PRN Tylenol given for incision/ abd pain; effective, voiding WDL, passing flatus, bowl sounds normoactive. Pt to be discharged.

## 2021-12-09 NOTE — PLAN OF CARE
Discharge instruction and medication went over with pt and spouse at bedside. Questions and concerns addressed. Pt discharge to home.

## 2021-12-09 NOTE — PLAN OF CARE
POD 0. Lethargic at times, but fully oriented. CMS - intact. Bowel sounds +x4, - flatus, has yet to drink clears. VSS on 4L. Lap sites with scant drainage. Up with A2, GB, W to BSC. C/o moderate to severe pain, decreased with dilaudid. CPAP in place with O2 while asleep. Pt scoring green on the Aggression Stop Light Tool. Continue to monitor.

## 2021-12-09 NOTE — CONSULTS
-NUTRITION EDUCATION    REASON FOR ASSESSMENT:  Bariatric Surgery Consult  **Telephone consult**    CURRENT DIET:  Bariatric Clear Liquid    NUTRITION HISTORY:  Patient worked with clinical dietitian in Weight Loss Clinic prior to surgery to assist with lifestyle modification.    ANTHROPOMETRICS:   Ht: 66 inches  Wt: 118.9 kg  BMI: 42.32 kg/(m^2)  IBW: 59.3 kg  %IBW: 201%    ASSESSED NUTRITION NEEDS:  Energy Needs: 1073-7417 kcals (11 - 14 kcal/kg ABW)                      Protein Needs: 59-89 g (1 - 1.5 gm/kg IBW)  Fluid Needs: 1308-80575 mL (1mL/kcal/ABW)    Know patient will not meet assessed needs due to the restrictive nature of surgery.    NUTRITION DIAGNOSIS:   Food- and nutrition related knowledge deficit related to bariatric surgery as evidence by robotic-assisted laparoscopic gastric bypass surgery on 12/8/2021.    INTERVENTIONS:    Nutrition Prescription:  Recommended patient follow bariatric diet advancement for laparoscopic gastric bypass    Implementation:    Nutrition Education (Content):  a) Reviewed laparoscopic gastric bypass diet guidelines  b) Provided handouts:  Your Stage 2 Diet, Low-Fat Full Liquids and Supplements After Weight Loss Surgery    Nutrition Education (Application):  c) Discussed current eating habits and recommended alternative food choices  d) Patient verbalizes understanding of diet by listing appropriate foods for home    Anticipate good compliance    Diet Education - refer to Education Flowsheet    Goals:    Patient will follow bariatric diet advancement schedule    Patient will follow post-operative vitamin mineral schedule      Follow Up:    Patient to follow up in 2 weeks with RD in Weight Loss Clinic    Provided RD contact information for future questions    Shelley Romero RD, LD  Clinical Dietitian

## 2021-12-09 NOTE — PLAN OF CARE
8572-9800: VSS on 2L bled into cpap. Oriented x 4, alert but lethargic at times. CMS intact. BS pressent, reports passing gas. Pt up with assist x1, voids without difficulty. Drinking bariatric size sips of clear liquid.  Lap sites w/ scant drainage to a couple bandages.  C/o of abdominal pain, relieved w/ oxycodone C/o N/V relieved x1 Zofran given PRN. Ice pack utilized. Continue current care. Morning labs electrolyte panel & hemoglobin. Scopolamine patch in place behind Left ear. Discharge pending.

## 2021-12-09 NOTE — PROGRESS NOTES
"[facility]  Bariatric Surgery Progress Note    Admission Date: 2021         Assessment and Plan:   Leanne Funes is a 46 year old female S/P Procedure(s):  ROBOTIC ASSISTED LAPAROSCOPIC GASTRIC BYPASS, 1 Day Post-Op.    - Continue bariatric clear liquid diet. Nutrition consult placed for today  - PO home meds restarted including losartan for HTN (hydrochlorothiazide on hold)  - Daily Omeprazole  - Lovenox started. Hemoglobin, electrolytes, and platelet count drawn this morning but not yet resulted. Will call lab to follow-up on these  - Appreciate Pharmacy and Nutrition assistance  - Encourage ambulation and IS    Dispo: expect discharge to home later today if continues to tolerate adequate clear liquids (at least 500cc total), pain and nausea controlled on PO meds  - Discharge instructions reviewed, questions answered  - Follow-up next week in weight loss clinic as scheduled             Interval History:   Afebrile overnight, VSS with slight hypertension. Used CPAP with O2 overnight, currently on room air with SpO2>90. Voiding well, up going for a walk currently. Nausea present at times but relieved with zofran. No vomiting. Tolerating PO oxy, helpful for pain. Taking in clear liquids since last night, doing well with medicine cups q15 minutes.                      Physical Exam:   Blood pressure (!) 141/77, pulse 88, temperature 97.4  F (36.3  C), temperature source Oral, resp. rate 18, height 1.676 m (5' 6\"), weight 118.9 kg (262 lb 3.2 oz), SpO2 91 %.  Temperature Temp  Av.7  F (36.5  C)  Min: 96.5  F (35.8  C)  Max: 98.6  F (37  C)   I/O last 3 completed shifts:  In: 2378 [P.O.:490; I.V.:1888]  Out: 535 [Urine:525; Blood:10]  Constitutional:  Awake, alert, oriented, and in no apparent distress.   Lungs: No increased work of breathing, clear to auscultation bilaterally, no crackles or wheezing.   Cardiovascular: Regular rate and rhythm, and no murmur noted.   Abdomen: Soft, " appropriately tender at incisions. Binder present.    Wounds: Not examined, binder in place   Extremities: No edema or calf tenderness. Wearing SCDs.          Data:     Results for orders placed or performed during the hospital encounter of 12/08/21 (from the past 24 hour(s))   Creatinine   Result Value Ref Range    Creatinine 0.86 0.52 - 1.04 mg/dL    GFR Estimate 81 >60 mL/min/1.73m2   Glucose by meter   Result Value Ref Range    GLUCOSE BY METER POCT 150 (H) 70 - 99 mg/dL       Katelynn Costa PA-C  [facility]  [phone]

## 2021-12-09 NOTE — PLAN OF CARE
POD#0. Oriented x 4, lethargic at times. CMS- intact. +BS, -flatus, voids without difficulty. Drinking bariatric size sips of clear liquid. VSS on 4L NC. Lap sites w/ scant drainage. Up w/ 1x assist to BR, ambulated x1 in hallway. C/o of abdominal pain, relieved w/ dilaudid. C/o N/V relieved x1 Zofran. Pt scoring Green on the Aggressive Stop Light Tool. Continue current care. Discharge pending.

## 2021-12-09 NOTE — DISCHARGE SUMMARY
[facility] Discharge Summary    Leanne Funes MRN# [id number]   Age: 46 year old YOB: 1975     Date of Admission:  12/8/2021  Date of Discharge:  12/9/2021  4:06 PM  Admitting Provider:  Taiwo Gomez MD  Discharging Service: Bariatric Surgery     Primary Provider: Jesi Dillard  Primary Care Physician Phone Number: None          Admission Diagnoses:   Principle Diagnosis: Morbid Obesity  Secondary Diagnoses:  HTN, pre-DM, Obstructive Sleep Apnea and depression associated with obesity          Discharge Diagnosis:   Same as above          Procedures:   Procedure(s): Robotically assisted laparoscopic Ileana-en-Y Gastric Bypass          Brief History of Illness:   Leanne Funes is a 46 year old-year-old female who underwent preoperative screening and thorough evaluation at the [facility] prior to bariatric surgery.  Surgical options were discussed. All questions were answered and she wished to proceed.          Hospital Course:     Leanne Funes's hospital course was unremarkable.  She recovered as anticipated and experienced no post-operative complications. She advanced per protocol and was tolerating a bariatric liquid diet appropriate for her post-operative status. Pain was controlled with with oral medications.  She was ambulating independently and remained afebrile.  Criteria for discharge to home were met.   She verbalized understanding of all discharge instructions and felt comfortable with the discharge plan.  She was asked to call with any further questions or concerns.           Consultations:     Dietician          Discharge Medications:     Current Discharge Medication List      START taking these medications    Details   omeprazole (PRILOSEC) 20 MG DR capsule Take 1 capsule (20 mg) by mouth every morning (before breakfast)  Qty: 90 capsule, Refills: 0    Associated Diagnoses: Morbid obesity with BMI of 40.0-44.9, adult (H)       !! ondansetron (ZOFRAN-ODT) 4 MG ODT tab Take 1 tablet (4 mg) by mouth every 6 hours as needed for nausea or vomiting  Qty: 30 tablet, Refills: 0    Associated Diagnoses: Morbid obesity with BMI of 40.0-44.9, adult (H)      oxyCODONE (ROXICODONE) 5 MG tablet Take 1 tablet (5 mg) by mouth every 3 hours as needed for moderate to severe pain  Qty: 18 tablet, Refills: 0    Associated Diagnoses: Morbid obesity with BMI of 40.0-44.9, adult (H)      simethicone (MYLICON) 40 MG/0.6ML suspension Take 0.6 mLs (40 mg) by mouth every 6 hours as needed for cramping  Qty: 30 mL, Refills: 0    Associated Diagnoses: Morbid obesity with BMI of 40.0-44.9, adult (H)       !! - Potential duplicate medications found. Please discuss with provider.      CONTINUE these medications which have NOT CHANGED    Details   acetaminophen (TYLENOL) 500 MG tablet Take 1,000 mg by mouth every 6 hours as needed for mild pain      albuterol (PROAIR HFA/PROVENTIL HFA/VENTOLIN HFA) 108 (90 Base) MCG/ACT inhaler Inhale 1 puff into the lungs as needed    Comments: Pharmacy may dispense brand covered by insurance (Proair, or proventil or ventolin or generic albuterol inhaler)      DULoxetine (CYMBALTA) 60 MG capsule Take 60 mg by mouth 2 times daily       !! gabapentin (NEURONTIN) 300 MG capsule Take 900 mg by mouth 2 times daily IN THE MORNING AND AFTERNOON  (300MG X 3 = 900MG)      !! gabapentin (NEURONTIN) 300 MG capsule Take 1,200 mg by mouth every evening (300MG X 4 = 1,200MG)      ketoconazole (NIZORAL) 2 % external cream Apply topically daily as needed for itching      losartan (COZAAR) 50 MG tablet Take 50 mg by mouth daily      multivitamin w/minerals (MULTI-VITAMIN) tablet Take 1 tablet by mouth daily      !! ondansetron (ZOFRAN ODT) 4 MG disintegrating tablet Take 1-2 tablets by mouth every 8 hours as needed for nausea.  Qty: 20 tablet, Refills: 1    Associated Diagnoses: Spider bite wound      OVER-THE-COUNTER Take 1 tablet by mouth daily as  "needed (NERVE PAIN) Medication Name: P.E.A.      risperiDONE (RISPERDAL) 3 MG tablet Take 3 mg by mouth 2 times daily       silver sulfADIAZINE (SILVADENE) 1 % cream Apply to wound BID  Qty: 85 g, Refills: 1    Associated Diagnoses: Spider bite wound      vitamin C (ASCORBIC ACID) 1000 MG TABS Take 1,000 mg by mouth daily      Vitamin D3 (CHOLECALCIFEROL) 25 mcg (1000 units) tablet Take 1 tablet by mouth daily       !! - Potential duplicate medications found. Please discuss with provider.      STOP taking these medications       hydrochlorothiazide (HYDRODIURIL) 12.5 MG tablet Comments:   Reason for Stopping:                    Condition on Discharge:      Discharge condition: Stable   Discharge vitals: Blood pressure (!) 141/77, pulse 88, temperature 97.4  F (36.3  C), temperature source Oral, resp. rate 18, height 1.676 m (5' 6\"), weight 118.9 kg (262 lb 3.2 oz), SpO2 91 %.           Discharge Disposition:     Discharged to home          Discharge Instructions and Follow-Up:      See Discharge instructions sheet.     Leanne Funes was asked to follow up with the [facility] within one week.  She will return the following week for further counseling with a Registered Dietician.       After Care Instructions     Activity      Your activity upon discharge: Walk more than four times daily.  Resume normal daily activities as tolerated.         Diet      Follow this diet upon discharge: Follow post-surgical bariatric diet as instructed by [facility] dietician.               Katelynn Costa PA-C  [facility]  [phone]         "

## 2021-12-09 NOTE — PROGRESS NOTES
Bariatric Surgery - Brief Note    Chart reviewed. AM labs pending. Tolerating a good amount of bariatric clears, nausea present but relieved with Zofran. No vomiting. Afebrile, vitals stable.     Continue with current cares, ambulate, PO meds as able. Hopeful discharge later today. Patient will be seen later this morning.      Katelynn Costa PA-C  [facility]  [phone]

## 2021-12-10 ENCOUNTER — TELEPHONE (OUTPATIENT)
Dept: SURGERY | Facility: CLINIC | Age: 46
End: 2021-12-10
Payer: COMMERCIAL

## 2021-12-10 NOTE — TELEPHONE ENCOUNTER
"Called pt to check on postop status.  Pt responses below:    Surgery Date: 12/8/21  Surgery Type: Bypass  Surgeon: BRP    Fluid Intake: 24 of water so far today    Pain Assessment:    Pain level: 4-5/10  Location: lower abdomen  When did it start: in hospital  Is it constant? With activity    How often does it occur? When standing up or sitting down  What makes it better or worse? Activity makes worse  Description: stretching  Oxycodone 5 mg at night  Not using tylenol     Medications:  RX for PPI?  Omeprazole   Do you understand how to take your medications postop?  \"kind of\"   Reviewed postop med changes:  Yes  STOP taking these medications         hydrochlorothiazide (HYDRODIURIL) 12.5 MG tablet Comments:   Reason for Stopping:            START Taking these medications  omeprazole (PRILOSEC) 20 MG DR capsule Take 1 capsule (20 mg) by mouth every morning (before breakfast)  Qty: 90 capsule, Refills: 0     Associated Diagnoses: Morbid obesity with BMI of 40.0-44.9, adult (H)       !! ondansetron (ZOFRAN-ODT) 4 MG ODT tab Take 1 tablet (4 mg) by mouth every 6 hours as needed for nausea or vomiting  Qty: 30 tablet, Refills: 0     Associated Diagnoses: Morbid obesity with BMI of 40.0-44.9, adult (H)       oxyCODONE (ROXICODONE) 5 MG tablet Take 1 tablet (5 mg) by mouth every 3 hours as needed for moderate to severe pain  Qty: 18 tablet, Refills: 0     Associated Diagnoses: Morbid obesity with BMI of 40.0-44.9, adult (H)       simethicone (MYLICON) 40 MG/0.6ML suspension Take 0.6 mLs (40 mg) by mouth every 6 hours as needed for cramping  Qty: 30 mL, Refills: 0     Associated Diagnoses: Morbid obesity with BMI of 40.0-44.9, adult (H)       Have you restarted all of the medications that weren't changed after surgery?  Yes  acetaminophen (TYLENOL) 500 MG tablet Take 1,000 mg by mouth every 6 hours as needed for mild pain       albuterol (PROAIR HFA/PROVENTIL HFA/VENTOLIN HFA) 108 (90 Base) MCG/ACT inhaler Inhale 1 puff " into the lungs as needed     Comments: Pharmacy may dispense brand covered by insurance (Proair, or proventil or ventolin or generic albuterol inhaler)       DULoxetine (CYMBALTA) 60 MG capsule Take 60 mg by mouth 2 times daily        !! gabapentin (NEURONTIN) 300 MG capsule Take 900 mg by mouth 2 times daily IN THE MORNING AND AFTERNOON  (300MG X 3 = 900MG)       !! gabapentin (NEURONTIN) 300 MG capsule Take 1,200 mg by mouth every evening (300MG X 4 = 1,200MG)       ketoconazole (NIZORAL) 2 % external cream Apply topically daily as needed for itching       losartan (COZAAR) 50 MG tablet Take 50 mg by mouth daily       multivitamin w/minerals (MULTI-VITAMIN) tablet Take 1 tablet by mouth daily       !! ondansetron (ZOFRAN ODT) 4 MG disintegrating tablet Take 1-2 tablets by mouth every 8 hours as needed for nausea.  Qty: 20 tablet, Refills: 1     Associated Diagnoses: Spider bite wound       OVER-THE-COUNTER Take 1 tablet by mouth daily as needed (NERVE PAIN) Medication Name: P.E.A.       risperiDONE (RISPERDAL) 3 MG tablet Take 3 mg by mouth 2 times daily        silver sulfADIAZINE (SILVADENE) 1 % cream Apply to wound BID  Qty: 85 g, Refills: 1     Associated Diagnoses: Spider bite wound       vitamin C (ASCORBIC ACID) 1000 MG TABS Take 1,000 mg by mouth daily       Vitamin D3 (CHOLECALCIFEROL) 25 mcg (1000 units) tablet Take 1 tablet by mouth daily     Do you have any questions about how to take your medications?  No  Advised she can wait on vitamins.     Diet: Yes Full liquid    Nausea: Yes - none today  When does it occur? Last night  Are you taking medications to help? Zofran  What aggravates your nausea? The ride home    Vomiting: No  When does it occur? NA  When did it start? NA  What are you vomiting? NA  Are you taking any medications to help it? NA    NSAIDs: No  Smoking: No    Fever: No  When did it start? NA    Incisions: They look good, advised to call if notices s/s of infection    BMs:  Last BM:  PTA  Color/consistency: NA  Flatus: Yes  Bloating/cramping:  No  Informed pt it is normal to have no stool for up to a week if passing gas and not having bloating/cramping. Took Senakot this AM.    Urinary:  No issues; urinating well; light color    Sleeping/Rest: Slept pretty good last night    Activity: Moving through the house; advised moving every hour to help prevent blood clots;    Activity caution:  Advised pt to be careful to avoid straining abdominal muscles during recovery.     Incentive Spirometer: using; right in center; advised to use every hour, get to 2000ml, will help prevent PNA    Other symptoms (CP/SOB/dizzy/rapid HR): No    Plan/Advice: 12/14/21 2:10 PM   Advised pt to contact PCP and schedule hospital follow up visit within 1 week of discharge. Discuss medications or recommendations from discharging provider.  1 week followup appointment verified.   Use of incentive spirometer reinforced.  Call clinic with any questions or concerns.  Reviewed that clinic staff are available on call during nights and weekends for postoperative concerns.  Number given.    No further questions or concerns now. Pt agrees to call if having any questions or concerns.  Loretta Vu RN on 12/10/2021 at 10:00 AM

## 2021-12-14 ENCOUNTER — OFFICE VISIT (OUTPATIENT)
Dept: SURGERY | Facility: CLINIC | Age: 46
End: 2021-12-14
Payer: COMMERCIAL

## 2021-12-14 VITALS
DIASTOLIC BLOOD PRESSURE: 75 MMHG | TEMPERATURE: 98.1 F | WEIGHT: 258 LBS | RESPIRATION RATE: 16 BRPM | HEART RATE: 86 BPM | SYSTOLIC BLOOD PRESSURE: 123 MMHG | OXYGEN SATURATION: 96 % | BODY MASS INDEX: 41.64 KG/M2

## 2021-12-14 DIAGNOSIS — Z98.84 BARIATRIC SURGERY STATUS: ICD-10-CM

## 2021-12-14 DIAGNOSIS — E66.01 MORBIDLY OBESE (H): Primary | ICD-10-CM

## 2021-12-14 DIAGNOSIS — E66.01 MORBID OBESITY WITH BMI OF 40.0-44.9, ADULT (H): Primary | ICD-10-CM

## 2021-12-14 DIAGNOSIS — K91.2 POSTSURGICAL MALABSORPTION: ICD-10-CM

## 2021-12-14 PROCEDURE — 99024 POSTOP FOLLOW-UP VISIT: CPT | Performed by: PHYSICIAN ASSISTANT

## 2021-12-14 PROCEDURE — 99207 PR NO CHARGE NURSE ONLY: CPT

## 2021-12-14 ASSESSMENT — PAIN SCALES - GENERAL: PAINLEVEL: MILD PAIN (3)

## 2021-12-14 NOTE — PROGRESS NOTES
Patient seen by provider in clinic who completed assessment.      Loretta Vu RN on 12/14/2021 at 2:44 PM

## 2021-12-14 NOTE — PATIENT INSTRUCTIONS
PLAN:   Continue with recommended antacid medication for the next 3 months.   Strive to drink 64 oz of fluid daily.  Strive to move hourly while awake to decrease risk of developing a blood clot.  Continue to do deep breathing exercises twice daily or use your spirometer.  Follow up with your primary care provider to discuss obesity related conditions by one month post op.   Start recommended postoperative vitamins within in the first 6 weeks.  Advance diet per Dietitian at your 2 week appointment.   Make follow up appointment to meet with psychologist at 1 and 3 months post operatively.   Wear binder to support abdominal muscles and gradually wean off over the next few weeks.   Return to clinic for 2 wk post op appointment and bring post op vitamins along.  Call with questions or concerns at any time.

## 2021-12-14 NOTE — PROGRESS NOTES
[facility]   1 Week Surgical Follow-Up     PCP:  Jesi Dillard    DOS: 12/08/21  Surgeon: GEE  Surgery Type: Bypass  HISTORY OF PRESENT ILLNESS:  Leanne Funes returns today for her follow-up appointment status post bariatric surgery.  She is currently using Tylenol 1000 mg TID for pain medication.  Refill Needed: No.  Patient's Pain Scale: 3. The pain is located right lower side.  Patient's current daily fluid intake in oz is: 60 (water, protein shake).  Patient has started postoperative Vitamins: No.  Using CPAP.  Moods good  Has appointment set up with primary for next week    Activity:   Activity: walking, moving around house q 1 hr     REVIEW OF SYSTEMS:  GI:    Nausea: Yes (until yesterday morning, taking pills too fast)  Vomiting: No  Diarrhea: No  Constipation: No  Last BM: yesterday brown, normal, no blood  Dysphagia: No  GERD: No -  Taking omeprazole daily    CV/Pulmonary:   Chest Pain: No  Dizzy: No  Light Headed: No  SOB: No  - Using IS at least 3 times daily  Endo:  Diabetes: No  :    Contraception: hyst  Dysuria: No  Vascular:    LE Edema: No     PHYSICAL EXAMINATION:    /75 (BP Location: Left arm, Patient Position: Sitting)   Pulse 86   Temp 98.1  F (36.7  C) (Oral)   Resp 16   Wt 258 lb (117 kg)   SpO2 96%   BMI 41.64 kg/m    GENERAL: No acute distress. Alert and oriented time 3.  HEART: Regular rate and rhythm.  LUNGS:  Clear to auscultation bilaterally.  ABDOMEN: Soft, incisions clean,dry, and intact. Tenderness WNL for post op.  EXTREMITIES: No lower extremity edema bilaterally. No calf swelling or tenderness. Negative calixto's  SKIN: No rashes.  PSYCHOLOGICAL: Stable. Pleasant      ASSESSMENT:    1. S/P bariatric surgery.  2. Post surgical malabsorption  3. Hypertension      PLAN:   Continue with recommended antacid medication for the next 3 months.   Strive to drink 64 oz of fluid daily.  Strive to move hourly while awake to decrease risk of developing a  blood clot.  Continue to do deep breathing exercises twice daily or use your spirometer.  Follow up with your primary care provider to discuss obesity related conditions by one month post op.   Start recommended postoperative vitamins within in the first 6 weeks.  Advance diet per Dietitian at your 2 week appointment.   Make follow up appointment to meet with psychologist at 1 and 3 months post operatively.   Wear binder to support abdominal muscles and gradually wean off over the next few weeks.   Return to clinic for 2 wk post op appointment and bring post op vitamins along.  Call with questions or concerns at any time.

## 2021-12-21 NOTE — PROGRESS NOTES
"BARIATRIC PROGRESS NOTE - 2 Week Post Op  DATE OF VISIT: December 21, 2021    Leanne Funes  1975  female  [id number]  46 year old    ASSESSMENT:    REASON FOR VISIT:  Leanne Funes is a 46 year old year old female presents today for a 2 Week Post Op nutrition follow-up appointment. Patient is accompanied by self      DIAGNOSIS:  Status: post gastric bypass surgery.   Obesity Grade II BMI 35-39.9    ANTHROPOMETRICS:  Height: 66.5\"    Initial weight: 283 lb   Current Weight: 247.7 lb   BMI: 39.98  kg/(m^2).    VITAMINS AND MINERALS:   2 gummie Multivitamin with Minerals (one a day womens formula-no iron per bottle)  2000 International units Vitamin D gummie  Hair, skin nail gummie (biotin, vitamin C, vitamin E)  No Iron needed      NUTRITION HISTORY:  Tolerating diet: Bariatric full liquid diet  Fluids/water intake: 60 ounces/day (water, protein drink, Propel water)  Small bites: Yes  Portion size: 1/4-1/2 cup  20-30 minute meals: Yes  Breakfast: Equate protein drink with 30 grams protein  Lunch: ~ 4 oz Greek yogurt  Dinner: 1/4-1/2 cup chicken broth  Snacks: no  Nausea: no  Vomiting: no  Constipation: no  Additional Information: Patient is tired of the low-fat full liquids. Denies any problems with eating over the past 2 weeks. Explained to patent that she does not need the hair, skin and nail supplement unless she want to continue it. Provided patient with a sample of a calcium chew and a bariatric multivitamins/ mineral as well as coupons for Bariatric Advantage.      PHYSICAL ACTIVITY:  Type: walking inside or out doors  Frequency: 7 days a week.  Duration: 15 minutes.    DIAGNOSIS:     Current Nutrition Diagnosis: Altered gastrointestinal function related to alternation in gastrointestinal structure as evidenced by history of gastric bypass.     INTERVENTION  Nutrition Prescription: Recommended bariatric puree diet.    Goals:  Start puree diet at day 14 post op.  Start: 0836-3879 mg calcium citrate (2-3 " separate doses)/day, 500-1000 mcg vitamin b-12/day, 100 mg Thiamine per week  Switch to a multivitamin/ mineral that meets clinic guidelines or a bariatric product (offered suggestions)  Increase vitamin D to 5000 international unit(s) per day  Aim for 60 to 90 grams protein.  Continue 48 to 64 oz of fluid per day.    Implementation:  Discussed transition to puree diet.  Emphasized importance of adequate protein.  Reviewed required vitamins and mineral supplements.  Verbalizes fair-good understanding of surgery diet guidelines.  Assessed learning needs and learning preferences.      NUTRITION MONITORING AND EVALUATION:   Monitor diet tolerance and weight loss.      Anticipated Compliance: fair-good  Follow Up: Continue to monitor patient closely regarding weight loss and diet.  At 4 weeks Post Op    TIME SPENT WITH PATIENT: 28 minutes.  Wiliam Holcomb RD, LD  [facility]

## 2021-12-22 ENCOUNTER — OFFICE VISIT (OUTPATIENT)
Dept: SURGERY | Facility: CLINIC | Age: 46
End: 2021-12-22
Payer: COMMERCIAL

## 2021-12-22 VITALS
HEART RATE: 78 BPM | SYSTOLIC BLOOD PRESSURE: 126 MMHG | OXYGEN SATURATION: 95 % | BODY MASS INDEX: 39.98 KG/M2 | WEIGHT: 247.7 LBS | TEMPERATURE: 98.2 F | RESPIRATION RATE: 16 BRPM | DIASTOLIC BLOOD PRESSURE: 83 MMHG

## 2021-12-22 DIAGNOSIS — Z98.84 BARIATRIC SURGERY STATUS: ICD-10-CM

## 2021-12-22 DIAGNOSIS — Z98.84 BARIATRIC SURGERY STATUS: Primary | ICD-10-CM

## 2021-12-22 DIAGNOSIS — E66.01 MORBID OBESITY WITH BMI OF 40.0-44.9, ADULT (H): ICD-10-CM

## 2021-12-22 PROCEDURE — 97803 MED NUTRITION INDIV SUBSEQ: CPT | Performed by: DIETITIAN, REGISTERED

## 2021-12-22 PROCEDURE — 99207 PR NO CHARGE NURSE ONLY: CPT

## 2021-12-22 NOTE — PROGRESS NOTES
[facility]  2 Week Surgical Follow-Up     HISTORY OF PRESENT ILLNESS:  The patient returns today for two week follow-up appointment status post gastric bypass  The patient is accompanied by self.   The patient is drinking 60 oz of fluid daily, including water, protein shake, electrolyte water.    Pain:    The patient is currently using Tylenol for pain medication.  The patient rates pain at a 3/10  on the pain scale. The pain is located in right abdomen.  Worse with movement up to 8/10, improves with rest. Soft abdomen at rest. Overall pt reports pain is improving.    Activity:  The patient is considered a fall risk:  No. Interventions to secure the patient's safety are in place.  What are you doing for physical activity?  walking  How many days per week?  everyday  How many minutes per day?  every hour  Review of Systems:  GI:    Nausea occurs never.  It is alleviated by NA.          Vomiting occurs never.  Patient thinks it was caused by NA.   GERD occurs never.  Patient is currently taking Omeprazol 20 mg daily.           Diarrhea occurs never.  Patient's last bowel movement was yesterday, with the color noted as brown.  Patient is taking NA to control loose bowel movements.          Constipation occurs never.           CV/Pulmonary:   Dizziness occurs never.     Shortness of Breath occurs never.  Chest pain occurs never.    Vascular:    Leg swelling none.     Dae's Negative     :  Form of birth control is hyst.    PHYSICAL EXAMINATION:    VITALS:  See Epic for VS.  LUNGS:  clear to auscultation  HEART:  Apical pulse strong, regular.  ABDOMEN:  Abdomen soft, non-tender. BS normal. Except tender right lower incision when standing and palpating. Not tender at rest.  INCISIONS:  dry and intact.  Steristrips removed.  Adhesive remover given to patient.    MEDICATIONS/ALLERGIES REVIEWED:  Yes    ASSESSMENT:    1.  2 weeks status post gastric bypass surgery.    PLAN:    Increase activity per  physical therapist recommendations today.   Make follow up appointment to meet with psychologist and 1 month post operatively.   Follow up with your primary care provider to obesity related conditions by one month post op.   Advance diet per Dietitian at your 2 week appointment.   Start recommended postoperative vitamins within in the first 6 weeks per Dietitian  Strive to drink 64 oz of fluid daily.  Wear binder to support abdominal muscles and gradually wean off over the next few weeks.   Continue to do deep breathing exercises twice daily or use your spirometer.   Call with questions or concerns at any time.  Return to clinic in 4 weeks for nutrition class.  Return to clinic postop month 3.  Call with questions or concerns at any time.    INTERVENTIONS:      Symptoms given re: signs and symptoms of infection and when to call clinic. Patient verbalized understanding.    Patient exercise/activity restrictions reviewed; exercise handout given.  Exercise plan postop is walking, gym membership.  Reviewed when patient can return to bathing and swimming.  deferred vitamin timing to RD.   Reviewed signs/symptoms of DVT with patient.  Reviewed FMLA.  No further forms needed at this time.    No further needs or questions at this time.  Patient agrees to call clinic with any questions or concerns prior to next appointment.      Loretta Vu RN on 12/22/2021 at 10:26 AM

## 2022-01-07 NOTE — PROGRESS NOTES
"Leanne is a 46 year old who is being evaluated via a billable video visit.      How would you like to obtain your AVS? [employer]  If the video visit is dropped, the invitation should be resent by: Text to cell phone: [phone]  Will anyone else be joining your video visit? No      Pt unable to connect to video platform - visit done via phone    BARIATRIC PROGRESS NOTE - 4 Week Post Op  DATE OF VISIT: January 7, 2022    Leanne Funes  1975  female  [id number]  46 year old    ASSESSMENT:    REASON FOR VISIT:  Leanne Funes is a 46 year old year old female presents today for a 4 Week Post Op nutrition follow-up appointment. Patient is accompanied by self      DIAGNOSIS:  Status: post gastric bypass surgery.   Obesity Grade II BMI 35-39.9    ANTHROPOMETRICS:  Height: 66.5\"   Initial weight: 283 lbs    Current Weight: (pt to send via [employer])        VITAMINS AND MINERALS:   2 Multivitamin with Minerals (AM)  650 mg Calcium With Vitamin D (AM + afternoon)  5000 International units Vitamin D  1000 mcg Vitamin B-12 injection   Vitamin B Complex  No iron needed per clinic guidelines        NUTRITION HISTORY:  Tolerating diet: Bariatric pureed diet  Fluids/water intake: 85 ounces/day (water, protein shakes)  Small bites: Yes  Portion size: 1/4-1/2c  20-30 minute meals: Yes  Fluids and meals  by 30 minutes: Yes  Chew foods thoroughly: Yes  Breakfast: yogurt  oatmeal  protein shake   Lunch: protein shake   Dinner: chicken noodle soup  yogurt   Snacks: none  Nausea: if eats too fast  Vomiting: none  Constipation: none  Additional Information: Pt feeling well and tolerating diet.       PHYSICAL ACTIVITY:  Type: stationary bike  Frequency: 7 days a week.  Duration: 30 minutes.    DIAGNOSIS:   Previous Nutrition Diagnosis: Altered gastrointestinal function related to alternation in gastrointestinal structure as evidenced by history of gastric bypass.   No change    Previous Goals:  Start puree diet at day 14 post " op. - met  Start: 9158-7695 mg calcium citrate (2-3 separate doses)/day, 500-1000 mcg vitamin b-12/day, 100 mg Thiamine per week - partially met  Switch to a multivitamin/ mineral that meets clinic guidelines or a bariatric product (offered suggestions) - met  Increase vitamin D to 5000 international unit(s) per day - met  Aim for 60 to 90 grams protein. - met  Continue 48 to 64 oz of fluid per day. - met    Current Nutrition Diagnosis: Altered gastrointestinal function related to alternation in gastrointestinal structure as evidenced by history of gastric bypass.     INTERVENTION  Nutrition Prescription: Recommended bariatric soft diet.    Goals:  Start soft diet at day 28 post op.  Continue MVI, calcium with vitamin D, vitamin B12, vitamin D, and vitamin B complex with vitamin C.  Take Calcium separate from MVT by at least 2 hours  Verify Thiamine content in Vitamin B Complex  Aim for 60 to 90 grams protein.  Continue 48 to 64 oz of fluid per day.    Implementation:  Discussed transition to soft diet.  Emphasized importance of adequate protein.  Reviewed required vitamins and mineral supplements.  Verbalizes fair-good understanding of surgery diet guidelines.  Assessed learning needs and learning preferences.      NUTRITION MONITORING AND EVALUATION:   Monitor diet tolerance and weight loss.      Anticipated Compliance: good  Follow Up: Continue to monitor patient closely regarding weight loss and diet.  At 3 months Post Op    TIME SPENT WITH PATIENT: 18 minutes.      Shelley Romero RD, LD  Clinical Dietitian

## 2022-01-10 ENCOUNTER — VIRTUAL VISIT (OUTPATIENT)
Dept: SURGERY | Facility: CLINIC | Age: 47
End: 2022-01-10
Payer: COMMERCIAL

## 2022-01-10 DIAGNOSIS — Z98.84 BARIATRIC SURGERY STATUS: ICD-10-CM

## 2022-01-10 DIAGNOSIS — E66.01 MORBID OBESITY (H): ICD-10-CM

## 2022-01-10 DIAGNOSIS — K91.2 POSTSURGICAL MALABSORPTION: ICD-10-CM

## 2022-01-10 DIAGNOSIS — E66.01 MORBID OBESITY WITH BMI OF 40.0-44.9, ADULT (H): ICD-10-CM

## 2022-01-10 PROCEDURE — 97803 MED NUTRITION INDIV SUBSEQ: CPT | Mod: GT | Performed by: DIETITIAN, REGISTERED

## 2022-01-10 NOTE — PATIENT INSTRUCTIONS
"4w Post-op    Hi Leanne!       **Note: I forgot to ask you for an updated weight during our visit this morning. Please obtain a weight at home or local clinic and send to us via opinions[employer]h**      Great chatting with you today! Here's a summary of your next diet stage (Stage 4). You ll stay in this phase for 2 months:    Your Stage 4 Diet: Soft Foods  [email or web address]     Over the next couple of months:    1. Aim for 600-800 calories  - Also: 60-90g protein and 10-15g fiber     2. Eat 3 food groups at each meal  - 1/2c protein, 1/4c vegetable/fruit, 1/4c fruit/starch  - I'll mail you a new sample meal plan    3.  Continue to eat slowly, chew well, and separate fluids and meals by 30 minutes     4. Limit \"snacks\" to milk or protein drinks     5. Avoid caffeine, carbonation and alcohol          Plan on following up in 2 months. This can be scheduled via our call center at . Of course, reach out sooner with any questions or concerns. Have a great day!           Shelley Romero RD, LD  Clinical Dietitian      "

## 2022-02-12 VITALS
BODY MASS INDEX: 41.91 KG/M2 | SYSTOLIC BLOOD PRESSURE: 124 MMHG | HEART RATE: 88 BPM | OXYGEN SATURATION: 92 % | DIASTOLIC BLOOD PRESSURE: 84 MMHG | RESPIRATION RATE: 20 BRPM | HEIGHT: 67 IN | WEIGHT: 267 LBS | TEMPERATURE: 98.4 F

## 2022-02-15 NOTE — NURSING NOTE
CAGE Assessment Entered On:  9/24/2020 12:40 PM CDT    Performed On:  9/24/2020 12:40 PM CDT by Qamar Chopra CMA               Assessment   Have you ever felt you should cut down on your drinking :   No   Have people annoyed you by criticizing your drinking :   No   Have you ever felt bad or guilty about your drinking :   No   Have you ever taken a drink first thing in the morning to steady your nerves or get rid of a hangover (Eye-opener) :   No   CAGE Score :   0    Qamar Chopra CMA - 9/24/2020 12:40 PM CDT

## 2022-02-15 NOTE — PROGRESS NOTES
Chief Complaint    New pt here to discuss RYAN and is needing new supplies.  History of Present Illness      45-year-old here to discuss her CPAP.       Patient has been CPAP since 2010.  She is always found it very helpful.  She has had very little adjustments just occasionally mass.  She uses CPAP at 10 cm.  Download of her chip shows regular use.  AHI is about 6 she has missed a few days which she describes as having to do with a broken mask and vacation  Review of Systems      See HPI.  All other review of systems negative.  Physical Exam   Vitals & Measurements    T: 98.4  F (Tympanic)  HR: 88 (Peripheral)  RR: 20  BP: 124/84  SpO2: 92%     HT: 67 in  WT: 267 lb  BMI: 41.81       Alert and oriented      Normal respiratory effort  Assessment/Plan       1. RYAN (Obstructive Sleep Apnea) (G47.33)         I recommend she bring her machine I could adjusted I recommend increasing the pressure up to 11.  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     Essential hypertension     Neuropathic pain of shoulder     Obesity     RYAN (Obstructive Sleep Apnea)     Tobacco abuse    Historical     Pregnancy     Pregnancy  Procedure/Surgical History     Dilation and curettage (12/05/2001)     Hysteroscopy (12/05/2001)     Laparoscopy (12/05/2001)     Hysterectomy (2001)     Appendectomy  Medications    EpiPen 2-Luis Miguel 0.3 mg injectable kit, 0.3 mg, IM, once    gabapentin, Oral, tid     hydrochlorothiazide-losartan 12.5 mg-50 mg oral tablet, 1 tab(s), Oral, daily, 5 refills  Allergies    Bee Stings    codeine (Nausea)  Social History    Smoking Status     Current every day smoker     Alcohol - Current      Current, Wine (5 oz), 1-2 times per month, 2 drinks/episode average.     Employment/School      Unemployed     Exercise - Regular exercise      Exercise frequency: 1-2 times/week. Exercise type: Walking.     Home/Environment      Marital status: . Spouse/Partner name: Fletcher Mcmullen children. Living situation: Home/Independent. Injuries/Abuse/Neglect in household: No. Feels unsafe at home: No. Family/Friends available for support: Yes.     Nutrition/Health      Type of diet: Low carbohydrate. Wants to lose weight: Yes. Sleeping concerns: Yes. Feels highly stressed: Yes.     Sexual      Sexually active: Yes. Identifies as female, Sexual orientation: Straight or heterosexual. History of STD: No. Contraceptive Use Details: Hysterectomy. History of sexual abuse: No.     Substance Abuse - Denies Substance Abuse      Never     Tobacco - Current      10 or more cigarettes (1/2 pack or more)/day in last 30 days, Cigarettes, 10 per day. 23 year(s).  Family History    CA - Breast cancer: Aunt (M).    Diabetes mellitus type 2: Mother and Father.    HTN - Hypertension: Mother.    Heart disease: Father.    Hypertension: Father.    Skin cancer: Mother and Aunt (P).  Immunizations      Vaccine Date Status          influenza virus vaccine, inactivated 08/26/2015 Given          tetanus/diphth/pertuss (Tdap) adult/adol 10/24/2014 Recorded          influenza virus vaccine, inactivated 10/01/2013 Recorded              Comments : [facility] [facility]          Td 12/08/2004 Recorded

## 2022-02-15 NOTE — TELEPHONE ENCOUNTER
---------------------  From: Rosie Arriaga CMA (Phone Messages Pool ([id number]))   To: [facility] Message Pool ([id number]);     Cc: Sleep Message Pool ([address] [place] [address]);      Sent: 9/30/2020 1:00:50 PM CDT  Subject: cpap supplies         Phone Message    PCP:   Kelly      Time of Call:  12:42pm       Person Calling:  Leanne  Phone number:  [phone]    Returned call at: _    Note:   Pt saw Dr. Palm  9/24 she states her mask is broken and can't use her cpap because of it. She has not been contacted by supply company yet and is wondering who we use so she can contact them to get her supplies.   It looks like when she was seen new masks where discuss but pt was going to call us when she wanted us to order. She was suppose to bring her machine in for Kelly to change the settings. She has not done this. Please advise     Last office visit and reason:  9/24/20---------------------  From: Khushbu Govea CMA ([facility] Message Pool ([id number]))   To: Shankar Palm MD;     Sent: 9/30/2020 1:33:08 PM CDT  Subject: FW: cpap supplies  ** Submitted: **  Order:Miscellaneous Rx Supply (CPAP Supplies)  See Instructions  Heated humidifier x1; Humidifier chamber x1;  Heated tubing x1; Full face mask of choice with headgear  x1; Cushion x 1;  Filters: Disposable x1pk & Reusable x1pk.  Length of Need = 99 Months  Qty:  1 EA        Refills:  11          Substitutions Allowed     Print - [email or web address]    Signed by Shankar Palm MD  9/30/2020 7:00:00 PM [facility]---------------------  From: Shankar Palm MD   To: [facility] Message Pool ([id number]);     Cc: Sleep Message Pool ([id number]);      Sent: 9/30/2020 2:01:52 PM CDT  Subject: RE: cpap supplies     i would like patient to bring machine in but she may want to do it after having a new mask for a couple of weeks  i put an order for new supplies and copies sleep [place] [phone] and advised   she states  she's been w/o a mask x 1wk - hasn't rec'd any supplies x 5yrs    RTC placed for 1month to f/u with Zimdiscussed with sleep pool and will work on thisOrder for CPAP supplies / new mask faxed to [facility] [phone]

## 2022-02-15 NOTE — NURSING NOTE
Depression Screening Entered On:  9/24/2020 12:40 PM CDT    Performed On:  9/24/2020 12:40 PM CDT by Qamar Chopra CMA               Depression Screening   Little Interest - Pleasure in Activities :   Several days   Feeling Down, Depressed, Hopeless :   Several days   Initial Depression Screen Score :   2 Score   Poor Appetite or Overeating :   Several days   Trouble Falling or Staying Asleep :   Several days   Feeling Tired or Little Energy :   Several days   Feeling Bad About Yourself :   Several days   Trouble Concentrating :   Several days   Moving or Speaking Slowly :   Not at all   Thoughts Better Off Dead or Hurting Self :   Not at all   ADOLFO Difficulty with Work, Home, Others :   Very difficult   Detailed Depression Screen Score :   5    Total Depression Screen Score :   7    Qamar Chopra CMA - 9/24/2020 12:40 PM CDT

## 2022-02-15 NOTE — LETTER
(Inserted Image. Unable to display)     November 02, 2020      SHIREEN HEAD   [address] [place] [id number]          Dear SHIREEN,      Thank you for selecting [facility] ([facility]viously [facility]) for your healthcare needs.    Our records indicate you are due for the following services:     Follow-up office visit for sleep apnea.    (FYI   Regarding office visits: In some instances, a video visit or telephone visit may be offered as an option.)      To schedule an appointment or if you have further questions, please contact your primary clinic:   [facility]       [phone]   [facility]       [phone]              [facility]     [phone]      Powered by [employer]    Sincerely,    Shankar Palm MD

## 2022-02-15 NOTE — NURSING NOTE
Comprehensive Intake Entered On:  9/24/2020 11:29 AM CDT    Performed On:  9/24/2020 11:21 AM CDT by Qamar Chopra CMA               Summary   Chief Complaint :   New pt here to discuss RYAN and is needing new supplies.   Weight Measured :   267 lb(Converted to: 267 lb 0 oz, 121.11 kg)    Height Measured :   67 in(Converted to: 5 ft 7 in, 170.18 cm)    Body Mass Index :   41.81 kg/m2 (HI)    Body Surface Area :   2.39 m2   Systolic Blood Pressure :   124 mmHg   Diastolic Blood Pressure :   84 mmHg (HI)    Mean Arterial Pressure :   97 mmHg   Peripheral Pulse Rate :   88 bpm   BP Site :   Right arm   Pulse Site :   Radial artery   Temperature Tympanic :   98.4 DegF(Converted to: 36.9 DegC)    Respiratory Rate :   20 br/min   Oxygen Saturation :   92 % (LOW)    Qamar Chopra CMA - 9/24/2020 11:21 AM CDT   Health Status   Allergies Verified? :   Yes   Medication History Verified? :   Yes   Immunizations Current :   Yes   Medical History Verified? :   Yes   Pre-Visit Planning Status :   Not completed   Tobacco Use? :   Current every day smoker   Tobacco Cessation Review :   Not ready to quit   Qamar Chopra CMA - 9/24/2020 11:21 AM CDT   Meds / Allergies   (As Of: 9/24/2020 11:29:36 AM CDT)   Allergies (Active)   Bee Stings  Estimated Onset Date:   Unspecified ; Comments:     Comment 1: wasp   ; Created By:   Lillian Barnes CMA; Reaction Status:   Active ; Category:   Drug ; Substance:   Bee Stings ; Type:   Allergy ; Updated By:   Lillian Barnes CMA; Reviewed Date:   9/24/2020 11:27 AM CDT      codeine  Estimated Onset Date:   Unspecified ; Reactions:   Nausea ; Created By:   Mervat Hernandez; Reaction Status:   Active ; Category:   Drug ; Substance:   codeine ; Type:   Allergy ; Updated By:   Mervat Hernandez; Reviewed Date:   9/24/2020 11:27 AM CDT        Medication List   (As Of: 9/24/2020 11:29:36 AM CDT)   Prescription/Discharge Order    ondansetron 4 mg oral tablet  :   ondansetron 4 mg oral tablet ; Status:    Completed ; Ordered As Mnemonic:   ondansetron 4 mg oral tablet ; Simple Display Line:   See Instructions, TAKE ONE TABLET BY MOUTH EVERY DAY, 10 tab(s), 4 Refill(s) ; Ordering Provider:   Daniel Burgos PA-C; Catalog Code:   ondansetron ; Order Dt/Tm:   9/16/2015 3:30:11 PM CDT          methocarbamol  :   methocarbamol ; Status:   Completed ; Ordered As Mnemonic:   methocarbamol 750 mg oral tablet ; Simple Display Line:   1-2 tab(s), PO, qhs, 180 tab(s) ; Ordering Provider:   Bruce Benitez MD; Catalog Code:   methocarbamol ; Order Dt/Tm:   8/26/2015 8:55:19 AM CDT          duloxetine  :   duloxetine ; Status:   Completed ; Ordered As Mnemonic:   duloxetine 60 mg oral delayed release capsule ; Simple Display Line:   60 mg, 1 cap(s), po, daily, 1 cap AM, 1/2 cap PM, 135 cap(s) ; Ordering Provider:   Bruce Benitez MD; Catalog Code:   DULoxetine ; Order Dt/Tm:   8/26/2015 8:53:11 AM CDT          sulfacetamide sodium topical  :   sulfacetamide sodium topical ; Status:   Completed ; Ordered As Mnemonic:   Seb-Prev 10% topical soap ; Simple Display Line:   1 isabel, top, bid, 340 mL ; Ordering Provider:   Bruce Benitez MD; Catalog Code:   sulfacetamide sodium topical ; Order Dt/Tm:   7/26/2012 2:17:06 PM CDT          hydrochlorothiazide-losartan  :   hydrochlorothiazide-losartan ; Status:   Prescribed ; Ordered As Mnemonic:   hydrochlorothiazide-losartan 12.5 mg-50 mg oral tablet ; Simple Display Line:   1 tab(s), PO, Daily, 30 tab(s), 5 Refill(s) ; Ordering Provider:   Bruce Benitez MD; Catalog Code:   hydrochlorothiazide-losartan ; Order Dt/Tm:   11/6/2015 3:13:06 PM CST          epinephrine  :   epinephrine ; Status:   Prescribed ; Ordered As Mnemonic:   EpiPen 2-Luis Miguel 0.3 mg injectable kit ; Simple Display Line:   0.3 mg, im, once, 1 kit(s) ; Ordering Provider:   Bruce Benitez MD; Catalog Code:   EPINEPHrine ; Order Dt/Tm:   8/26/2015 8:57:47 AM CDT            Home Meds    gabapentin   :   gabapentin ; Status:   Documented ; Ordered As Mnemonic:   gabapentin ; Simple Display Line:   Oral, tid, 0 Refill(s) ; Catalog Code:   gabapentin ; Order Dt/Tm:   9/24/2020 11:26:48 AM CDT            ID Risk Screen   Recent Travel History :   No recent travel   Family Member Travel History :   No recent travel   Other Exposure to Infectious Disease :   Unknown   Nav GHASSANQamar - 9/24/2020 11:21 AM CDT   Procedures / Surgeries        -    Procedure History   (As Of: 9/24/2020 11:29:36 AM CDT)     Procedure Dt/Tm:   2001 ; Anesthesia Minutes:   0 ; Procedure Name:   Hysterectomy ; Procedure Minutes:   0            Procedure Dt/Tm:   12/5/2001 ; Anesthesia Minutes:   0 ; Procedure Name:   Hysteroscopy ; Procedure Minutes:   0            Procedure Dt/Tm:   12/5/2001 ; Anesthesia Minutes:   0 ; Procedure Name:   Dilation and curettage ; Procedure Minutes:   0            Procedure Dt/Tm:   12/5/2001 ; Anesthesia Minutes:   0 ; Procedure Name:   Laparoscopy ; Procedure Minutes:   0            Social History   Social History   (As Of: 9/24/2020 11:29:36 AM CDT)   Alcohol:  Current      1-2 times per week, 4 drinks/episode maximum.   (Last Updated: 7/10/2014 11:39:19 AM CDT by Marisabel Gómez)          Tobacco:  Current      Current, Cigarettes, 20 per day.   (Last Updated: 5/10/2010 2:31:03 PM CDT by Mervat Alegre CMA)          Substance Abuse:  Denies Substance Abuse      Never   (Last Updated: 7/10/2014 11:39:29 AM CDT by Marisabel Gómez)          Home/Environment:        Marital status: .  Spouse/Partner name: Talat.   (Last Updated: 7/10/2014 11:38:40 AM CDT by Marisabel Gómez)          Exercise:  Regular exercise      Exercise frequency: 4-6 times/week.  Exercise type: Walking, Meditation.   (Last Updated: 7/10/2014 11:40:02 AM CDT by Marisabel Gómez)          Sexual:        Sexually active: Yes.  Sexual orientation: Heterosexual.   (Last Updated: 7/10/2014 11:40:17 AM CDT by Marisabel Gómez)

## 2022-03-10 ENCOUNTER — DOCUMENTATION ONLY (OUTPATIENT)
Dept: SURGERY | Facility: CLINIC | Age: 47
End: 2022-03-10

## 2022-03-10 ENCOUNTER — LAB (OUTPATIENT)
Dept: LAB | Facility: CLINIC | Age: 47
End: 2022-03-10
Payer: COMMERCIAL

## 2022-03-10 ENCOUNTER — OFFICE VISIT (OUTPATIENT)
Dept: SURGERY | Facility: CLINIC | Age: 47
End: 2022-03-10
Payer: MEDICARE

## 2022-03-10 VITALS
BODY MASS INDEX: 35.36 KG/M2 | HEIGHT: 66 IN | WEIGHT: 220 LBS | HEART RATE: 82 BPM | SYSTOLIC BLOOD PRESSURE: 120 MMHG | DIASTOLIC BLOOD PRESSURE: 70 MMHG

## 2022-03-10 VITALS — HEIGHT: 66 IN | WEIGHT: 220 LBS | BODY MASS INDEX: 35.36 KG/M2

## 2022-03-10 DIAGNOSIS — Z98.84 BARIATRIC SURGERY STATUS: ICD-10-CM

## 2022-03-10 DIAGNOSIS — E66.01 MORBID OBESITY (H): ICD-10-CM

## 2022-03-10 DIAGNOSIS — Z09 SURGERY FOLLOW-UP EXAMINATION: Primary | ICD-10-CM

## 2022-03-10 DIAGNOSIS — E66.01 MORBID OBESITY WITH BMI OF 40.0-44.9, ADULT (H): ICD-10-CM

## 2022-03-10 DIAGNOSIS — K91.2 POSTSURGICAL MALABSORPTION: ICD-10-CM

## 2022-03-10 DIAGNOSIS — R11.10 EMESIS: ICD-10-CM

## 2022-03-10 DIAGNOSIS — Z98.84 STATUS POST GASTRIC BYPASS FOR OBESITY: Primary | ICD-10-CM

## 2022-03-10 LAB
FERRITIN SERPL-MCNC: 91 NG/ML (ref 8–252)
IRON SATN MFR SERPL: 16 % (ref 15–46)
IRON SERPL-MCNC: 54 UG/DL (ref 35–180)
PTH-INTACT SERPL-MCNC: 37 PG/ML
TIBC SERPL-MCNC: 335 UG/DL (ref 240–430)
VIT B12 SERPL-MCNC: 548 PG/ML (ref 193–986)

## 2022-03-10 PROCEDURE — 97803 MED NUTRITION INDIV SUBSEQ: CPT | Performed by: DIETITIAN, REGISTERED

## 2022-03-10 PROCEDURE — 82306 VITAMIN D 25 HYDROXY: CPT

## 2022-03-10 PROCEDURE — 99024 POSTOP FOLLOW-UP VISIT: CPT | Performed by: SURGERY

## 2022-03-10 PROCEDURE — 83550 IRON BINDING TEST: CPT

## 2022-03-10 PROCEDURE — 82728 ASSAY OF FERRITIN: CPT

## 2022-03-10 PROCEDURE — 36415 COLL VENOUS BLD VENIPUNCTURE: CPT

## 2022-03-10 PROCEDURE — 82607 VITAMIN B-12: CPT

## 2022-03-10 PROCEDURE — 83970 ASSAY OF PARATHORMONE: CPT

## 2022-03-10 PROCEDURE — 84590 ASSAY OF VITAMIN A: CPT

## 2022-03-10 NOTE — PROGRESS NOTES
Per Dr. Gomez - patient seen for 3 moth PO RNY.  Needs labs done.  Ordered in PROMISE Silva's name per clinic protocol.  Lucie Lewis, MS, RD, RN

## 2022-03-10 NOTE — PROGRESS NOTES
Patient presents in 3-month follow-up after robotic assisted laparoscopic Ileana-en-Y gastric bypass.  Surgery performed on December 8, 2021.  Preoperative weight was around 270 pounds.  She is now down to 220.  She overall seems to be doing very well.  She seems very pleased with her early postsurgical outcome.  Primary concern at this time is of postprandial vomiting after certain food.  She reports that she is getting in around a gallon of water daily.  She does not have any issues with swallowing or nausea or vomiting with fluids.  Certain food material particularly proteins are causing her these symptoms.  She does feel that she is chewing her food effectively and slowing her mealtime down.  She continues to take her vitamins and minerals as instructed.  She is still using 2 protein shakes a day one of them as a meal replacement.  She is exercising on a regular basis.    On examination: Her lungs are clear to auscultation bilaterally.  Cardiovascular exam is regular rate and rhythm without murmur.  Abdomen is soft and nondistended.  Incisions are well-healed.    Overall I think she is doing well for 3 months postop.  Weight is down 50 pounds.  I believe her vomiting is most likely secondary to behaviors and food choices.  I will send her for an upper GI to evaluate for possible stricture.  She is going to have labs drawn and will meet with diet today as well.  No other concerns.  Continue as she currently is and follow-up along the anticipated schedule.

## 2022-03-10 NOTE — PATIENT INSTRUCTIONS
"      Rosales Perdomo!        Great chatting with you today! Here's a summary of your next diet stage (Stage 5). This will become your baseline diet, life-long.     Your Stage 5 Diet: Regular Foods  [email or web address]       Goals:    1. Aim for 600-800 calories  - Also: 60-90g protein and 10-15g fiber     2. Eat 3 food groups at each meal  - 1/2c protein, 1/4c vegetable/fruit, 1/4c fruit/starch - especially at lunch and dinner    3.  Continue to eat slowly, chew well, and separate fluids and meals by 30 minutes  - At dinner: take tiny (pea-sized) bites, chew well, pause between bites (set utensil down)     4. Limit \"snacks\" to milk or protein drinks   - Have up to 1 protein drink per day OR 2 cups of low-fat milk  - Try to time this for mid-afternoon, to avoid becoming overly hungry at dinner    5. Avoid caffeine, carbonation and alcohol    6. Continue to take all post-op vitamins  -Increase Calcium to 2x/day (afternoon and evening)  -Check dose of Thiamin (Vitamin B1) in Vitamin B Complex (okay to reduce to 50mg 2x/week OR 100mg 1x/week)         Plan on following up in 3 months. This can be scheduled via our call center at . Of course, reach out sooner with any questions or concerns. Have a great day!           Shelley Romero RD, LD  Clinical Dietitian   "

## 2022-03-10 NOTE — PROGRESS NOTES
"NUTRITION POST OP APPOINTMENT  DATE OF VISIT: March 10, 2022    Leanne Funes  1975  female  [id number]  46 year old     ASSESSMENT:    REASON FOR VISIT:  Leanne is a 46 year old year old female presents today for 3 month PO nutrition follow-up appointment. Patient is accompanied by self.    DIAGNOSIS:  Status post gastric bypass surgery.  Obesity Obesity Grade II BMI 35-39.9     ANTHROPOMETRICS:  Initial Weight: 283 lbs    Height: 5' 6\"    Current Weight: 220 lbs 0 oz     BMI: Body mass index is 35.51 kg/m .    VITAMINS AND MINERALS:  2 Multivitamin with Minerals ([place] Complete - morning)  650 mg Calcium With Vitamin D (evening)  5000 International units Vitamin D  1000 mcg Vitamin B-12 injection 1st of each month  Vitamin B Complex - morning  No iron needed per clinic guidelines     NUTRITION HISTORY:  Breakfast: [wakes at 5am; eats at 8:30am] yogurt w/granola (tablespoon)  cottage cheese  Lunch: [1pm] protein shake   Supper: [5-6pm] meat (chicken, beef, steak, pork) + vegetables  Snacks: [evenings] cheese stick (2x/week)   Fluids consumed: Water (1 gallon), protein shake (11oz), iced tea (3-4x/week; 20oz unsweetened and caffeinated)   Consuming liquid calories: Yes  Protein intake: 60-70 grams/day  Tolerate regular texture food: Yes  Any foods not tolerated details: No  If any food not tolerated: n/a  Portion size: 1/2-1 cup  Take 20-30 minutes to consume each meal: Yes/usually  Eat protein foods first: Yes  Fluids and meals separate by at least 30 minutes: Yes  Chew foods thoroughly: Yes/usually  Tolerating diet: Yes  Drinking high protein supplements: Yes  Consuming snacks per day: occasional  Additional Information: Pt seen per request of [facility] for standard 3m post-op RD visit as well as assess vomiting. Upon diet recall and review of post-op behaviors, pt believes her vomiting is potentially d/t eating too quickly at dinner and taking large bites. Admits she is quite hungry by the time she arrives " to dinner. Reviewed lifestyle behaviors contributing to long term success as well as GI comfort. Reviewed vitamin/mineral needs. Recommended add strength training and reduce caffeine.     Vomiting: during evening meal, right away       PHYSICAL ACTIVITY:  Type: stationary bicyle  Frequency (days per week): 5  Duration (min): 60+    DIAGNOSIS:  Previous Nutrition Diagnosis: Altered gastrointestinal function related to alteration in gastrointestinal structure as evidenced by history of gastric bypass surgery.- no change    Previous goals:  Start soft diet at day 28 post op. - met  Continue MVI, calcium with vitamin D, vitamin B12, vitamin D, and vitamin B complex with vitamin C. - met  Take Calcium separate from MVT by at least 2 hours - met  Verify Thiamine content in Vitamin B Complex - met  Aim for 60 to 90 grams protein. - met  Continue 48 to 64 oz of fluid per day. - met    Current Nutrition Diagnosis: Altered gastrointestinal function related to alteration in gastrointestinal structure as evidenced by history of gastric bypass surgery.    INTERVENTION:   Nutrition Prescription: Eat 3 meals a day at regular intervals. Consume 60-90 grams of protein daily. Follow post-surgical vitamin and mineral protocol.  Assessed learning needs and learning preferences.    GOALS:  Include 3 food groups at each meal  Avoid eating too quickly or large bites at dinner  Avoid caffeine  Add in strength training  Increase Calcium to BID  Verify dose of Thiamine in Vitamin B Complex    Follow-Up:   Recommend standard post-op follow up visits to assist with lifestyle changes or per insurance.  Implementation: Discussed progress toward previous goals; reinforced importance of following bariatric lifestyle changes.    NUTRITION MONITORING AND EVALUATION:  Anticipated compliance: fair-good  Verbalized good understanding.    Follow up: Patient to follow up in 3 months, at 6 months post-op    TIME SPENT WITH PATIENT:  30  debo Romero RD, LD  Clinical Dietitian

## 2022-03-11 LAB — DEPRECATED CALCIDIOL+CALCIFEROL SERPL-MC: 51 UG/L (ref 20–75)

## 2022-03-13 LAB
ANNOTATION COMMENT IMP: NORMAL
RETINYL PALMITATE SERPL-MCNC: 0.04 MG/L
VIT A SERPL-MCNC: 0.66 MG/L

## 2022-06-25 ENCOUNTER — HEALTH MAINTENANCE LETTER (OUTPATIENT)
Age: 47
End: 2022-06-25

## 2022-08-20 ENCOUNTER — HEALTH MAINTENANCE LETTER (OUTPATIENT)
Age: 47
End: 2022-08-20

## 2022-08-26 ENCOUNTER — DOCUMENTATION ONLY (OUTPATIENT)
Dept: FAMILY MEDICINE | Facility: CLINIC | Age: 47
End: 2022-08-26

## 2022-08-26 DIAGNOSIS — I10 ESSENTIAL HYPERTENSION: ICD-10-CM

## 2022-08-26 DIAGNOSIS — G47.33 OBSTRUCTIVE SLEEP APNEA SYNDROME: Primary | ICD-10-CM

## 2022-09-02 NOTE — PROGRESS NOTES
Return Bariatric Surgery Note    RE: Leanne Funes  MR#: [id number]  : 1975  VISIT DATE: 2022    Dear Jesi Dillard,    I had the pleasure of seeing your patient, Leanne Funes, in my post-bariatric surgery assessment clinic.    Assessment & Plan   Problem List Items Addressed This Visit     Class 1 obesity due to excess calories with serious comorbidity and body mass index (BMI) of 30.0 to 30.9 in adult    Obstructive sleep apnea syndrome     Continue using CPAP nightly  With weight loss you may need adjustments with you CPAP.  Call you sleep clinic if mask becomes too loose or pressures seem high.    Will consider follow up sleep study at 1 year post op.             Prediabetes    Relevant Orders    Hemoglobin A1c    Bariatric surgery status - Primary     2021 RYGBS [facility]           Postsurgical malabsorption     Continue taking recommended post-op vitamins.  Labs ordered per protocol.             Relevant Orders    CBC with platelets    Vitamin D Screen    Parathyroid Hormone Intact    Iron and Iron Binding Capacity    Ferritin           PATIENT INSTRUCTIONS:   Continue vitamins  Have labs drawn  Continue CPAP  Add in strength training twice a week     FOLLOW-UP:  Return to clinic in December for your annual visit.     25 minutes spent on the date of the encounter doing chart review, history and exam, result review, counseling, developing plan of care, documentation, and further activities as noted        CHIEF COMPLAINT: Post-bariatric surgery follow-up    HISTORY OF PRESENT ILLNESS:  Questions Regarding Prior Weight Loss Surgery Reviewed With Patient 2022   I had the following weight loss procedure: Ileana-en-y Gastric Bypass   What year was your surgery?    How has your weight changed since your last visit? I have lost weight   Are you currently taking any weight loss medications? No   Do you currently have any of the following: Sleep Apnea, Heartburn, acid reflux, or GERD (acid reflux  disease)?   Have you been to the Emergency room since your last visit with us? No   Were you in the hospital since your last visit with us? No   Do you have any concerns today? No   Weight loss has sowed and she wants to lose at least 20 more lbs.      Weight History:     9/8/2022   What is your highest lifetime weight? 305   What is your lowest weight since surgery? (In pounds) 189     Initial Weight (lbs): 283 lbs  Weight: 191 lb 3.2 oz (86.7 kg)  Last Visits Weight: 220 lb (99.8 kg)  Cumulative weight loss (lbs): 91.8  Weight Loss Percentage: 32.44%     Wt Readings from Last 10 Encounters:   09/08/22 191 lb 3.2 oz (86.7 kg)   09/08/22 191 lb 3.2 oz (86.7 kg)   03/10/22 220 lb (99.8 kg)   03/10/22 220 lb (99.8 kg)   12/22/21 247 lb 11.2 oz (112.4 kg)   12/14/21 258 lb (117 kg)   12/09/21 262 lb 3.2 oz (118.9 kg)   10/07/21 270 lb 9.6 oz (122.7 kg)   08/27/21 272 lb (123.4 kg)   07/08/21 284 lb 11.2 oz (129.1 kg)       VITAMINS AND MINERALS:  2 Multivitamin with Minerals ([place] Complete - morning)  650 mg Calcium With Vitamin D (twice daily)  5 1000 International units Vitamin D daily  1000 mcg Vitamin B-12 injection 1st of each month  Vitamin B Complex - morning  No iron needed per clinic guidelines       Questions Regarding Co-Morbidities and Health Concerns Reviewed With Patient 9/8/2022   Pre-diabetes: Gone away   Diabetes II: Never   High Blood Pressure: Gone Away   High cholesterol: Never   Heartburn/Reflux: Stayed the same   Are you taking daily medication for heartburn, acid reflux, or GERD (acid reflux disease)? Yes   Sleep apnea: Stayed the same   Do you use a CPAP? Yes   PCOS: Never   Back pain: Gone away   Joint pain: Stayed the same   Lower leg swelling: Gone away       Eating Habits 9/8/2022   How many meals do you eat per day? 4   Do you snack between meals? No   How much food are you eating at each meal? 1/2 cup to 1 cup   Are you able to separate your meals and liquids by at least 30  minutes? Yes   Are you able to avoid liquid calories? Yes   Occasional hunger between meals but feels it is due to dehydration.      Exercise Questions Reviewed With Patient 9/8/2022   How often do you exercise? 3 to 4 times per week   What is the duration of your exercise (in minutes)? 30 Minutes   What types of exercise do you do? home gym   What keeps you from being more active?  Pain   Pain has improved significantly since she lost the weight.  Is much more active throughout the day.      Social History:      9/8/2022   Are you smoking? No   Are you drinking alcohol? No       Medications:  Current Outpatient Medications   Medication     acetaminophen (TYLENOL) 500 MG tablet     gabapentin (NEURONTIN) 300 MG capsule     ketoconazole (NIZORAL) 2 % external cream     multivitamin w/minerals (THERA-VIT-M) tablet     omeprazole (PRILOSEC) 20 MG DR capsule     ondansetron (ZOFRAN ODT) 4 MG disintegrating tablet     OVER-THE-COUNTER     silver sulfADIAZINE (SILVADENE) 1 % cream     vitamin C (ASCORBIC ACID) 1000 MG TABS     Vitamin D3 (CHOLECALCIFEROL) 25 mcg (1000 units) tablet     DULoxetine (CYMBALTA) 60 MG capsule     No current facility-administered medications for this visit.         9/8/2022   Do you avoid NSAIDs such as (Ibuprofen, Aleve, Naproxen, Advil)?   Yes       ROS:  GI:      9/8/2022   Vomiting: No   Diarrhea: Yes, self induced if eating too much sugar   Constipation: No   Swallowing trouble: No   Abdominal pain: No   Heartburn: Yes, takes omeprazole daily.  This works well for her.  Tried to discontinue but had night reflux.     Rash in skin folds: Yes   Depression: No   Stress urinary incontinence Yes     Skin:   ASHLEY VERGARA - SKIN 9/8/2022   Rash in skin folds: Yes, can macerate, and she will use old t-shirt strips to put between the skin and this works.       Psych:      9/8/2022   Depression: No   Anxiety: No     Female Only:   ASHLEY VERGARA - FEMALE ONLY 9/8/2022   Female only: None of the above  "      LABS/IMAGING/MEDICAL RECORDS REVIEW:   Hemoglobin A1C   Date Value Ref Range Status   05/17/2021 6.0 (H) 0 - 5.6 % Final     Comment:     Normal <5.7% Prediabetes 5.7-6.4%  Diabetes 6.5% or higher - adopted from ADA   consensus guidelines.       Vitamin D, Total (25-Hydroxy)   Date Value Ref Range Status   03/10/2022 51 20 - 75 ug/L Final     Parathyroid Hormone Intact   Date Value Ref Range Status   03/10/2022 37 pg/mL Final     Comment:     : 8.7-79.6 pg/mL  : 12-64 pg/mL  Other races - Normal range is not specified     Vitamin B12   Date Value Ref Range Status   03/10/2022 548 193 - 986 pg/mL Final     Hemoglobin   Date Value Ref Range Status   12/09/2021 12.4 11.7 - 15.7 g/dL Final     Ferritin   Date Value Ref Range Status   03/10/2022 91 8 - 252 ng/mL Final     Iron   Date Value Ref Range Status   03/10/2022 54 35 - 180 ug/dL Final     Iron Binding Capacity   Date Value Ref Range Status   03/10/2022 335 240 - 430 ug/dL Final     Iron Sat Index   Date Value Ref Range Status   03/10/2022 16 15 - 46 % Final     Vitamin A   Date Value Ref Range Status   03/10/2022 0.66 0.30 - 1.20 mg/L Final       PHYSICAL EXAMINATION:  /85   Pulse 70   Ht 5' 6\" (1.676 m)   Wt 191 lb 3.2 oz (86.7 kg)   SpO2 97%   BMI 30.86 kg/m    GENERAL: Healthy, alert and no distress  EYES: Eyes grossly normal to inspection.  No discharge or erythema, or obvious scleral/conjunctival abnormalities.  RESP: No audible wheeze, cough, or visible cyanosis.  No visible retractions or increased work of breathing.    SKIN: Visible skin clear. No significant rash, abnormal pigmentation or lesions.  NEURO: Cranial nerves grossly intact.  Mentation and speech appropriate for age.  PSYCH: Mentation appears normal, affect normal/bright, judgement and insight intact, normal speech and appearance well-groomed.    COUNSELING PROVIDED:  We reviewed the important post op bariatric recommendations:  eating 3 meals daily  eating " protein first, getting >60gm protein daily  eating slowly, chewing food well  avoiding/limiting calorie containing beverages  avoiding fluids 30 minutes before, during, and after meals  limiting restaurant or cafeteria eating to twice a week or less  Pt reminded to avoid marginal ulcers she should avoid tobacco at all, alcohol in excess, caffeine, and NSAIDS (unless indicated for cardioprotection or othewise and opposed by a PPI).  Pt encouraged to establish and maintain a consistent physical activity routine, 6-8 hours of restorative sleep each night and optimal stress management.  Pt counseled on the importance of life long vitamin supplementation and life long follow up.    Sincerely,    Carly Nair PA-C

## 2022-09-08 ENCOUNTER — OFFICE VISIT (OUTPATIENT)
Dept: SURGERY | Facility: CLINIC | Age: 47
End: 2022-09-08
Payer: COMMERCIAL

## 2022-09-08 VITALS
WEIGHT: 191.2 LBS | OXYGEN SATURATION: 97 % | BODY MASS INDEX: 30.73 KG/M2 | DIASTOLIC BLOOD PRESSURE: 85 MMHG | SYSTOLIC BLOOD PRESSURE: 124 MMHG | HEART RATE: 70 BPM | HEIGHT: 66 IN

## 2022-09-08 VITALS — BODY MASS INDEX: 30.73 KG/M2 | WEIGHT: 191.2 LBS | HEIGHT: 66 IN

## 2022-09-08 DIAGNOSIS — Z98.84 BARIATRIC SURGERY STATUS: Primary | ICD-10-CM

## 2022-09-08 DIAGNOSIS — E66.09 CLASS 1 OBESITY DUE TO EXCESS CALORIES WITH SERIOUS COMORBIDITY AND BODY MASS INDEX (BMI) OF 30.0 TO 30.9 IN ADULT: ICD-10-CM

## 2022-09-08 DIAGNOSIS — K91.2 POSTSURGICAL MALABSORPTION: ICD-10-CM

## 2022-09-08 DIAGNOSIS — E66.811 CLASS 1 OBESITY DUE TO EXCESS CALORIES WITH SERIOUS COMORBIDITY AND BODY MASS INDEX (BMI) OF 30.0 TO 30.9 IN ADULT: ICD-10-CM

## 2022-09-08 DIAGNOSIS — G47.33 OBSTRUCTIVE SLEEP APNEA SYNDROME: ICD-10-CM

## 2022-09-08 DIAGNOSIS — Z98.84 BARIATRIC SURGERY STATUS: ICD-10-CM

## 2022-09-08 DIAGNOSIS — E66.01 MORBID OBESITY WITH BMI OF 40.0-44.9, ADULT (H): ICD-10-CM

## 2022-09-08 DIAGNOSIS — R73.03 PREDIABETES: ICD-10-CM

## 2022-09-08 PROCEDURE — 97803 MED NUTRITION INDIV SUBSEQ: CPT | Performed by: DIETITIAN, REGISTERED

## 2022-09-08 PROCEDURE — 99213 OFFICE O/P EST LOW 20 MIN: CPT | Performed by: PHYSICIAN ASSISTANT

## 2022-09-08 NOTE — PATIENT INSTRUCTIONS
"Nice to talk with you today. Thank you for allowing me the privilege of caring for you. We hope we provided you with the excellent service you deserve.     To ensure the quality of our services you may receive a patient satisfaction survey from an independent monitoring company.  The greatest compliment you can give is \"Likely to Recommend\"    Below is our plan we discussed.-  PROMISE Carroll      Labs have been ordered in the system.You can have these drawn at any [facility] lab.  Please call [phone] set up an appointment.  Your results will be posted on [facility] [facility] [employer] as soon as they're complete.  After all are resulted, I will review and comment to you.    Continue your bariatric vitamins     FOLLOW-UP:  Please call [phone] to schedule your next visit.     Bariatric Post Op Guidelines  General:    To avoid marginal ulcers avoid all forms of tobacco, alcohol in excess, caffeine, and NSAIDS (unless indicated for cardioprotection or othewise and opposed by a PPI).    Exercise is key for continued weight loss and weight maintenance. Aim for 30-60 minutes of physical activity most days of the week. Include cardiovascular and strength training.    Continue lifelong vitamins supplementation and annual lab follow up.  All  patients should supplement with the following bariatric postoperative vitamins:  2 Complete multivitamins with minerals (at different times than calcium)  Vitamin D 5000 Int Units/125 mg daily   Calcium 600 mg twice daily or 500 mg three times daily   Vitamin B12: 500 mcg sl daily or 1000 mcg Inj monthly  B complex daily or Thiamine 100 mg weekly  1 Iron/Vit C. Daily for females who menstruate and/or as directed    The bariatric team should be aware and evaluate all adverse gastrointestinal symptoms which can be a sign of complication. Inability to tolerate textured solid food (chicken, steak, fish) may need to be evaluated by endoscopy.    There is a 10% increase of Alcohol Use Disorder in " "patients with bariatric surgery.   Most often occurring around 2 years post op.  Please call the clinic if you feel alcohol is interfering in your daily life.  We can help.     Follow up annually lifelong. Obesity is a chronic disease.  Weight gain can be expected. The goal of follow-up visits is to ensure adequate vitamin and protein absorption, evaluate food intake behavior, review exercise/activity level, and assist with weight regain.    Nutritional:  Eat 3 meals per day  (No snacks between meals.)  Do not skip meals.  This can cause overeating at the next meal and will prevent adequate protein and nutritional intake.    Aim for 60-80 grams of protein per day.  Always eat your protein first. This assists with optimal nutrition and helps you stay full longer.    Eat your protein first, and then follow with fiber.    Add fiber by including fruits, vegetables, whole grains, and beans.     Portions should be about 1 cup per meal. Use measuring cups to be accurate.  Continue to use saucer/salad plates, infant/toddler silverware to keep portion sizes small and take small bites.    Eat S-L-O-W-L-Y to make each meal last 20-30 minutes. Always stop eating when satisfied.    Aim for 64 oz. of calorie-free fluids daily.    Avoid drinking 30 min before, during, and 30 min after meal    Avoid high sugar and high fat foods to prevent high calorie intake. This will reduce your rate of weight loss and can cause weight regain.   Check nutrition labels for less than 10 grams of sugar and less than 10 grams of fat per serving.       Plateau Busters    Be a Calorie   Calories have a way of creeping up while we're not paying attention. Using a food diary for a while is a great way to monitor this. Remember, liquid calories like soda and alcoholic beverages count!     Take your workout up a notch  Forget the \"fat burning zone.\" High intensity intervals -- 30 to 60 seconds -- are the wave of the future. If you're accustomed " to level three on your cardio machine, ramp it up to level 6 for a minute then slow down, catch your breath and repeat.     Strength Training  If you're not strength training, start now. And if you are, ramp it up a notch. Muscle is your greatest ally in breaking a plateau. Unfortunately many women train with weights too light to produce the metabolic boost they need. Don't be afraid of heavier weights. They should be heavy enough that you can only do between 8 and 12 reps.     Track your protein  You should be getting at least 60 grams of protein daily.  Protein boosts the metabolism and increases satiety, making it more likely that you won't overeat.     Take Inventory  Other things besides diet and exercise could be stalling your weight loss, such as stress, lack of sleep or medication. Take a look at what else is going on in your life that might need attention.         04/15/2013

## 2022-09-08 NOTE — PATIENT INSTRUCTIONS
"Rosales Perdomo!        Great chatting with you today! Here's a summary of your goals over the next several months:         Goals:    1. Aim for 600-800 calories  - Also: 60-90g protein and 10-15g fiber     2. Eat 3 food groups at each meal  - 1/2c protein, 1/4c vegetable/fruit, 1/4c fruit/starch    3.  Continue to eat slowly, chew well, and separate fluids and meals by 30 minutes     4. Limit \"snacks\" to milk or protein drinks   - Have up to 1 protein drink per day OR 2 cups of low-fat milk    5. Avoid caffeine, carbonation and alcohol            Plan on following up in 3 months. This can be scheduled via our call center at . Of course, reach out sooner with any questions or concerns. Have a great day!           Shelley Romero RD, LD  Clinical Dietitian   "

## 2022-09-08 NOTE — PROGRESS NOTES
"NUTRITION POST OP APPOINTMENT  DATE OF VISIT: September 8, 2022    Leanne Funes  1975  female  [id number]  47 year old     ASSESSMENT:    REASON FOR VISIT:  Leanne is a 47 year old year old female presents today for 9 month PO nutrition follow-up appointment. Patient is accompanied by young grandson.    DIAGNOSIS:  Status post gastric bypass surgery.  Obesity Obesity Grade I BMI 30-34.9     ANTHROPOMETRICS:  Initial Weight: 283 lbs    Height: 5' 6\"    Current Weight: 191 lbs 3.2 oz     BMI: Body mass index is 30.86 kg/m .    VITAMINS AND MINERALS:  2 Multivitamin with Minerals ([place] Complete - 5am)  650 mg Calcium With Vitamin D (mid-morning + evening)  5000 International units Vitamin D  1000 mcg Vitamin B-12 injection 1st of each month  Vitamin B Complex - morning (pt unsure of Thiamin content)  No iron needed per clinic guidelines       NUTRITION HISTORY:  Breakfast: [wakes at 5am, eats at 7:30-8am] scrambled eggs (1.5) + onion + ham (1 slice) + cheese (1/4-1/2c; low-fat cheddar)  Lunch: [12-12:30pm] 1/2c cottage cheese + 10 pieces of pineapple   Supper: [5-6pm; occasionally 8pm] meat + 2 vegetables +/- sweet potatoes   Snacks: [mornings] protein shake (uses as coffee creamer)  [afternoons or evenings; 2x/week] protein chips, fruit, 2 string cheese sticks  Fluids consumed: Water (~100oz), coffee (caffeinated, 8oz), protein shake (12oz), tea (30oz; decaf, unsweetened)   Consuming liquid calories: Yes  Protein intake: 60-90 grams/day  Tolerate regular texture food: Yes  Any foods not tolerated details: Yes  If any food not tolerated: eggs (improving, however), avoids bread (sits heavy in stomach), jellybeans (dumping syndrome)   Portion size: 1/2-1 cup  Take 20-30 minutes to consume each meal: Yes  Eat protein foods first: Yes  Fluids and meals separate by at least 30 minutes: Yes with meals  Chew foods thoroughly: Yes  Tolerating diet: Yes  Drinking high protein supplements: Yes  Consuming snacks per " day: 1  Additional Information: Pt feeling well and tolerating diet. She would like to lose an additional 20 lbs and then maintain. Reviewed appropriate portion, ratio and snacking guidelines. She has started to add some strength training and plans to use resistance bands more consistently.         PHYSICAL ACTIVITY:  Type: walking  Frequency (days per week): 7  Duration (min): 30    DIAGNOSIS:  Previous Nutrition Diagnosis: Altered gastrointestinal function related to alteration in gastrointestinal structure as evidenced by history of gastric bypass surgery.- no change    Previous goals:  Include 3 food groups at each meal - improving  Avoid eating too quickly or large bites at dinner - improving  Avoid caffeine - not met  Add in strength training - partially met  Increase Calcium to BID - met  Verify dose of Thiamine in Vitamin B Complex - pt unsure    Current Nutrition Diagnosis: Altered gastrointestinal function related to alteration in gastrointestinal structure as evidenced by history of gastric bypass surgery.    INTERVENTION:   Nutrition Prescription: Eat 3 meals a day at regular intervals. Consume 60-90 grams of protein daily. Follow post-surgical vitamin and mineral protocol.  Assessed learning needs and learning preferences.    GOALS:  Aim for 600-800 calories per day for ongoing weight loss  Replace snacks with milk or protein drinks  Aim for 1 cup of food containing 3 food groups  Increase strength training    Follow-Up:   Recommend standard post-op follow up visits to assist with lifestyle changes or per insurance.  Implementation: Discussed progress toward previous goals; reinforced importance of following bariatric lifestyle changes.    NUTRITION MONITORING AND EVALUATION:  Anticipated compliance: fair-good  Verbalized good understanding.    Follow up: Patient to follow up in 3 months (at 1y post-op)    TIME SPENT WITH PATIENT:  23 minutes        Shelley Romero RD, LD  Clinical Dietitian

## 2022-11-20 ENCOUNTER — HEALTH MAINTENANCE LETTER (OUTPATIENT)
Age: 47
End: 2022-11-20

## 2023-07-02 ENCOUNTER — HEALTH MAINTENANCE LETTER (OUTPATIENT)
Age: 48
End: 2023-07-02

## 2023-09-10 ENCOUNTER — HEALTH MAINTENANCE LETTER (OUTPATIENT)
Age: 48
End: 2023-09-10

## 2024-07-12 ENCOUNTER — DOCUMENTATION ONLY (OUTPATIENT)
Dept: SURGERY | Facility: CLINIC | Age: 49
End: 2024-07-12
Payer: COMMERCIAL

## 2024-11-03 ENCOUNTER — HEALTH MAINTENANCE LETTER (OUTPATIENT)
Age: 49
End: 2024-11-03

## (undated) DEVICE — SUCTION CANISTER MEDIVAC LINER 3000ML W/LID 65651-530

## (undated) DEVICE — DRSG BANDAID 1X3" FABRIC CURITY LATEX FREE KC44101

## (undated) DEVICE — DAVINCI XI FCP CADIERE 470049

## (undated) DEVICE — DAVINCI XI DRAPE COLUMN 470341

## (undated) DEVICE — SUCTION IRR STRYKERFLOW II W/TIP 250-070-520

## (undated) DEVICE — DAVINCI HOT SHEARS TIP COVER  400180

## (undated) DEVICE — PREP CHLORAPREP 26ML TINTED ORANGE  260815

## (undated) DEVICE — GOWN IMPERVIOUS SPECIALTY XLG/XLONG 32474

## (undated) DEVICE — LINEN TOWEL PACK X5 5464

## (undated) DEVICE — SOL WATER IRRIG 1000ML BOTTLE 2F7114

## (undated) DEVICE — GASTRECTOMY SLEEVE STABILIZATION SYSTEM VISIGI 3D 36FR 5236B

## (undated) DEVICE — DAVINCI XI OBTURATOR BLADELESS 8MM 470359

## (undated) DEVICE — DAVINCI XI HANDPIECE ESU VESSEL SEALER 8MM EXT 480422

## (undated) DEVICE — SYSTEM CLEARIFY VISUALIZATION 21-345

## (undated) DEVICE — DRAPE BREAST/CHEST 29420

## (undated) DEVICE — DAVINCI XI NDL DRIVER LARGE 470006

## (undated) DEVICE — PACK LAP CHOLE SLC15LCFSD

## (undated) DEVICE — SOL NACL 0.9% IRRIG 1000ML BOTTLE 2F7124

## (undated) DEVICE — STPL DAVINCI SUREFORM 60MM STR 480460

## (undated) DEVICE — SOL NACL 0.9% INJ 1000ML BAG 2B1324X

## (undated) DEVICE — SU VICRYL 0 TIE 12X18" J906G

## (undated) DEVICE — DAVINCI XI GRASPER FENESTRATED TIP UP 8MM 470347

## (undated) DEVICE — DAVINCI XI SEAL UNIVERSAL 5-8MM 470361

## (undated) DEVICE — DAVINCI XI DRAPE ARM 470015

## (undated) DEVICE — DAVINCI XI REDUCER 8-12MM 470381

## (undated) DEVICE — ESU GROUND PAD UNIVERSAL W/O CORD

## (undated) DEVICE — ENDO TROCAR FIRST ENTRY KII FIOS Z-THRD 11X100MM CTF33

## (undated) DEVICE — LUBRICANT INST ELECTROLUBE EL101

## (undated) DEVICE — STPL DAVINCI SUREFORM 60MM RELOAD BLUE 48360B

## (undated) DEVICE — LIGHT HANDLE X2

## (undated) DEVICE — DRAPE SHEET REV FOLD 3/4 9349

## (undated) DEVICE — DAVINCI SEAL CANNULA AND STPL 12MM 470380

## (undated) DEVICE — SU VICRYL 4-0 PS-2 18" UND J496H

## (undated) DEVICE — SU STRATAFIX PDS PLUS 2-0 SPIRAL VIOLET SPXX1B415

## (undated) DEVICE — GLOVE PROTEXIS W/NEU-THERA 8.0  2D73TE80

## (undated) DEVICE — ENDO TROCAR FIRST ENTRY KII FIOS Z-THRD 05X100MM CTF03

## (undated) RX ORDER — FENTANYL CITRATE 50 UG/ML
INJECTION, SOLUTION INTRAMUSCULAR; INTRAVENOUS
Status: DISPENSED
Start: 2021-12-08

## (undated) RX ORDER — ONDANSETRON 2 MG/ML
INJECTION INTRAMUSCULAR; INTRAVENOUS
Status: DISPENSED
Start: 2021-12-08

## (undated) RX ORDER — HYDRALAZINE HYDROCHLORIDE 20 MG/ML
INJECTION INTRAMUSCULAR; INTRAVENOUS
Status: DISPENSED
Start: 2021-12-08

## (undated) RX ORDER — DEXAMETHASONE SODIUM PHOSPHATE 4 MG/ML
INJECTION, SOLUTION INTRA-ARTICULAR; INTRALESIONAL; INTRAMUSCULAR; INTRAVENOUS; SOFT TISSUE
Status: DISPENSED
Start: 2021-12-08

## (undated) RX ORDER — BUPIVACAINE HYDROCHLORIDE 2.5 MG/ML
INJECTION, SOLUTION EPIDURAL; INFILTRATION; INTRACAUDAL
Status: DISPENSED
Start: 2021-12-08

## (undated) RX ORDER — HYDROMORPHONE HCL IN WATER/PF 6 MG/30 ML
PATIENT CONTROLLED ANALGESIA SYRINGE INTRAVENOUS
Status: DISPENSED
Start: 2021-12-08

## (undated) RX ORDER — PROPOFOL 10 MG/ML
INJECTION, EMULSION INTRAVENOUS
Status: DISPENSED
Start: 2021-12-08

## (undated) RX ORDER — INDOCYANINE GREEN AND WATER 25 MG
KIT INJECTION
Status: DISPENSED
Start: 2021-12-08

## (undated) RX ORDER — LIDOCAINE HYDROCHLORIDE 20 MG/ML
INJECTION, SOLUTION EPIDURAL; INFILTRATION; INTRACAUDAL; PERINEURAL
Status: DISPENSED
Start: 2021-12-08

## (undated) RX ORDER — HYDROMORPHONE HYDROCHLORIDE 1 MG/ML
INJECTION, SOLUTION INTRAMUSCULAR; INTRAVENOUS; SUBCUTANEOUS
Status: DISPENSED
Start: 2021-12-08

## (undated) RX ORDER — CEFAZOLIN SODIUM IN 0.9 % NACL 3 G/100 ML
INTRAVENOUS SOLUTION, PIGGYBACK (ML) INTRAVENOUS
Status: DISPENSED
Start: 2021-12-08

## (undated) RX ORDER — HEPARIN SODIUM 5000 [USP'U]/.5ML
INJECTION, SOLUTION INTRAVENOUS; SUBCUTANEOUS
Status: DISPENSED
Start: 2021-12-08

## (undated) RX ORDER — EPINEPHRINE 1 MG/ML
INJECTION, SOLUTION INTRAMUSCULAR; SUBCUTANEOUS
Status: DISPENSED
Start: 2021-12-08

## (undated) RX ORDER — KETAMINE HCL IN NACL, ISO-OSM 100MG/10ML
SYRINGE (ML) INJECTION
Status: DISPENSED
Start: 2021-12-08